# Patient Record
Sex: FEMALE | Race: WHITE | NOT HISPANIC OR LATINO | Employment: FULL TIME | URBAN - METROPOLITAN AREA
[De-identification: names, ages, dates, MRNs, and addresses within clinical notes are randomized per-mention and may not be internally consistent; named-entity substitution may affect disease eponyms.]

---

## 2019-07-03 ENCOUNTER — OFFICE VISIT (OUTPATIENT)
Dept: BARIATRICS/WEIGHT MGMT | Facility: CLINIC | Age: 49
End: 2019-07-03

## 2019-07-03 ENCOUNTER — RESULTS ENCOUNTER (OUTPATIENT)
Dept: BARIATRICS/WEIGHT MGMT | Facility: CLINIC | Age: 49
End: 2019-07-03

## 2019-07-03 ENCOUNTER — DOCUMENTATION (OUTPATIENT)
Dept: BARIATRICS/WEIGHT MGMT | Facility: CLINIC | Age: 49
End: 2019-07-03

## 2019-07-03 VITALS
OXYGEN SATURATION: 100 % | DIASTOLIC BLOOD PRESSURE: 74 MMHG | BODY MASS INDEX: 37.89 KG/M2 | HEART RATE: 75 BPM | RESPIRATION RATE: 18 BRPM | HEIGHT: 68 IN | SYSTOLIC BLOOD PRESSURE: 120 MMHG | TEMPERATURE: 97.4 F | WEIGHT: 250 LBS

## 2019-07-03 DIAGNOSIS — E66.9 OBESITY, CLASS II, BMI 35-39.9: Primary | ICD-10-CM

## 2019-07-03 DIAGNOSIS — K21.9 GASTROESOPHAGEAL REFLUX DISEASE, ESOPHAGITIS PRESENCE NOT SPECIFIED: ICD-10-CM

## 2019-07-03 DIAGNOSIS — R06.09 DYSPNEA ON EXERTION: ICD-10-CM

## 2019-07-03 DIAGNOSIS — R13.10 DYSPHAGIA, UNSPECIFIED TYPE: ICD-10-CM

## 2019-07-03 DIAGNOSIS — M54.9 BACK PAIN, UNSPECIFIED BACK LOCATION, UNSPECIFIED BACK PAIN LATERALITY, UNSPECIFIED CHRONICITY: ICD-10-CM

## 2019-07-03 DIAGNOSIS — R10.13 DYSPEPSIA: ICD-10-CM

## 2019-07-03 DIAGNOSIS — I10 ESSENTIAL HYPERTENSION: ICD-10-CM

## 2019-07-03 PROCEDURE — 99204 OFFICE O/P NEW MOD 45 MIN: CPT | Performed by: SURGERY

## 2019-07-03 RX ORDER — ATENOLOL 50 MG/1
50 TABLET ORAL DAILY
COMMUNITY

## 2019-07-03 RX ORDER — ERGOCALCIFEROL 1.25 MG/1
50000 CAPSULE ORAL WEEKLY
COMMUNITY
End: 2019-12-02

## 2019-07-03 RX ORDER — RANITIDINE 150 MG/1
150 TABLET ORAL 2 TIMES DAILY
COMMUNITY
End: 2019-10-22

## 2019-07-03 RX ORDER — CHLORTHALIDONE 25 MG/1
25 TABLET ORAL DAILY
COMMUNITY
End: 2019-10-25 | Stop reason: HOSPADM

## 2019-07-03 RX ORDER — DEXLANSOPRAZOLE 30 MG/1
30 CAPSULE, DELAYED RELEASE ORAL 2 TIMES DAILY
COMMUNITY
End: 2020-02-07

## 2019-07-03 NOTE — PROGRESS NOTES
[facility] GROUP BARIATRIC SURGERY  [address]  [facility] [address]  [phone]      Patient  Name:  Danielle Steward  :  1970      Date of Visit: 7/3/2019      Chief Complaint:  weight gain; unable to maintain weight loss    History of Present Illness:  Danielle Steward is a 48 y.o. female who presents today for evaluation, education and consultation regarding weight loss surgery. The patient is interested in sleeve gastrectomy.  She is a self-pay patient.     Danielle has been overweight for at least 25 years, has been 35 pounds or more overweight for at least 25 years, has been 100 pounds or more overweight for 15 or more years and started dieting at age 14.  The patient describes their eating habits as emotional eater and snacker.      Previous diet attempts include: WW, low carb diet, Adipex, Ozempic (lots of nausea) among others.  The most weight Danielle lost was 40 pounds on WW but was only able to maintain that weight loss for 4 years.  Her maximum lifetime weight is 275 pounds.    As above, patient has been overweight for many years, with numerous failed dietary/weight loss attempts.  She now has obesity related comorbidities and as such has decided to pursue weight loss surgery.    All past medical, surgical, social and family history have been obtained and discussed as pertinent to bariatric surgery as below.     She has friends who have had surgery at this practice and are doing well.      No personal hx of VTE, MI, kidney/liver failure      Past Medical History:   Diagnosis Date   • Back pain    • Blindness and low vision     right eye only, legally blind in right eye, has peripheral vision only   • Delayed gastric emptying     suspected given large amount of retained food on EGD.   • Dysphagia     occasional   • GERD (gastroesophageal reflux disease)     severe, on Dexilant bid and Zantac daily, controls. EGD by Dr. Agosto in [place] showed EGD 40  mg, findings of large amount of retained food.     • Glaucoma (increased eye pressure)     per pt not glaucoma yet, but hx of increased pressure in left eye, which is her only functional eye   • Hypertension    • Osteoarthritis    • Prediabetes    • Sleep disturbance     snoring, has not been tested for SUMAYA   • Vitamin D deficiency      Past Surgical History:   Procedure Laterality Date   • CATARACT EXTRACTION Right    • CATARACT EXTRACTION WITH INTRAOCULAR LENS IMPLANT Right    • HYSTEROSCOPY ENDOMETRIAL ABLATION     • LAPAROSCOPIC TUBAL LIGATION     • LASIK     • STRABISMUS SURGERY Right        Allergies   Allergen Reactions   • Methylprednisolone Other (See Comments)     Avoids due to increased pressure in eye/glaucoma risk per her optometrist       Current Outpatient Medications:   •  atenolol (TENORMIN) 50 MG tablet, Take 50 mg by mouth Daily., Disp: , Rfl:   •  chlorthalidone (HYGROTON) 25 MG tablet, Take 25 mg by mouth Daily., Disp: , Rfl:   •  dexlansoprazole (DEXILANT) 30 MG capsule, Take 30 mg by mouth Daily., Disp: , Rfl:   •  metFORMIN (GLUCOPHAGE) 500 MG tablet, Take 500 mg by mouth Daily., Disp: , Rfl:   •  Multiple Vitamins-Minerals (PRESERVISION AREDS 2 PO), Take  by mouth., Disp: , Rfl:   •  raNITIdine (ZANTAC) 150 MG tablet, Take 150 mg by mouth 2 (Two) Times a Day., Disp: , Rfl:   •  vitamin D (ERGOCALCIFEROL) 21087 units capsule capsule, Take 50,000 Units by mouth 1 (One) Time Per Week., Disp: , Rfl:     Social History     Socioeconomic History   • Marital status:      Spouse name: Not on file   • Number of children: Not on file   • Years of education: Not on file   • Highest education level: Not on file   Occupational History   • 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       Family History   Problem Relation Age of Onset   • Heart attack Maternal Grandfather 75       Review of Systems   Constitutional: Positive for fatigue and unexpected weight gain. Negative for chills, diaphoresis, fever and unexpected weight loss.   HENT: Negative for congestion and facial swelling.    Eyes: Negative for blurred vision, double vision and discharge.   Respiratory: Negative for chest tightness, shortness of breath and stridor.    Cardiovascular: Negative for chest pain, palpitations and leg swelling.   Gastrointestinal: Negative for blood in stool.   Endocrine: Negative for polydipsia.   Genitourinary: Negative for hematuria.   Musculoskeletal: Positive for arthralgias.   Skin: Negative for color change.   Allergic/Immunologic: Negative for immunocompromised state.   Neurological: Negative for confusion.   Psychiatric/Behavioral: Negative for self-injury.        Physical Exam:  Vital Signs:  Weight: 113 kg (250 lb)   Body mass index is 38.58 kg/m².  Temp: 97.4 °F (36.3 °C)   Heart Rate: 75   BP: 120/74     Physical Exam   Constitutional: She is oriented to person, place, and time. She appears well-developed and well-nourished.   HENT:   Head: Normocephalic and atraumatic.   Nose: Nose normal.   Eyes: Conjunctivae and EOM are normal. Pupils are equal, round, and reactive to light.   Neck: Normal range of motion. Neck supple. Carotid bruit is not present. No tracheal deviation present. No thyromegaly present.   Cardiovascular: Normal rate, regular rhythm and normal heart sounds.   Pulmonary/Chest: Effort normal and breath sounds normal. No respiratory distress.   Abdominal: Soft. She exhibits no distension. There is no hepatosplenomegaly. There is no tenderness.   Lap incision at umbilicus   Musculoskeletal: Normal range of motion. She exhibits no edema or deformity.    Neurological: She is alert and oriented to person, place, and time. No cranial nerve deficit. Coordination normal.   Skin: Skin is warm and dry. No rash noted.   Psychiatric: She has a normal mood and affect. Her behavior is normal. Judgment and thought content normal.   Vitals reviewed.      There is no problem list on file for this patient.      Assessment:    Danielle Steward is a 48 y.o. year old female with medically complicated obesity pursuing sleeve gastrectomy.    Weight loss surgery is deemed medically necessary given the following obesity related comorbidities including hypertension, back pain and gall bladder disease with current Weight: 113 kg (250 lb) and Body mass index is 38.58 kg/m²..    She is a good candidate for weight loss surgery pending further evaluation.    Plan:  The consultation plan and program requirements were reviewed with the patient.  The patient has been advised that a letter of medical support must be obtained from her primary care physician or referring provider. A psychological evaluation will be arranged.  A nutritional evaluation will be performed.  The patient was advised to start a high protein and low carbohydrate diet.  Necessary lifestyle modifications were discussed.  Instructions on how to access SULLY was given to the patient.  SULLY is an internet based educational video that explains the surgical procedure chosen and answers basic questions regarding that procedure.     Preoperative testing will include: CBC, CMP, Lipids, TSH, HgA1C, H.Pylori, CXR, EKG and gastric emptying study (to workup finding of retained food on prior EGD).  Suggest she get a sleep study re: suspected sleep apnea.  Outside EGD report reviewed.    Preop clearances required prior to surgery will include Cardiac.      Patient understands that bariatric surgery is not cosmetic surgery but rather a tool to help make a lifelong commitment to lifestyle changes including diet, exercise, behavior  modifications, and healthy habits.  The patient has been educated on expected postoperative lifestyle changes, including commitment to high protein diet, vitamin regimen, and exercise program.  They are aware that support groups are encouraged for optimal weight loss results.            Sherin Danielson MD              ADDENDUM:    Gastric emptying study with slightly delayed gastric emptying.

## 2019-07-04 LAB
ALBUMIN SERPL-MCNC: 4.2 G/DL (ref 3.5–5.5)
ALBUMIN/GLOB SERPL: 1.3 {RATIO} (ref 1.2–2.2)
ALP SERPL-CCNC: 109 IU/L (ref 39–117)
ALT SERPL-CCNC: 20 IU/L (ref 0–32)
AST SERPL-CCNC: 18 IU/L (ref 0–40)
BASOPHILS # BLD AUTO: 0 X10E3/UL (ref 0–0.2)
BASOPHILS NFR BLD AUTO: 0 %
BILIRUB SERPL-MCNC: 0.4 MG/DL (ref 0–1.2)
BUN SERPL-MCNC: 17 MG/DL (ref 6–24)
BUN/CREAT SERPL: 14 (ref 9–23)
CALCIUM SERPL-MCNC: 9.6 MG/DL (ref 8.7–10.2)
CHLORIDE SERPL-SCNC: 91 MMOL/L (ref 96–106)
CHOLEST SERPL-MCNC: 161 MG/DL (ref 100–199)
CO2 SERPL-SCNC: 31 MMOL/L (ref 20–29)
CREAT SERPL-MCNC: 1.18 MG/DL (ref 0.57–1)
EOSINOPHIL # BLD AUTO: 0.3 X10E3/UL (ref 0–0.4)
EOSINOPHIL NFR BLD AUTO: 3 %
ERYTHROCYTE [DISTWIDTH] IN BLOOD BY AUTOMATED COUNT: 12.7 % (ref 12.3–15.4)
GLOBULIN SER CALC-MCNC: 3.3 G/DL (ref 1.5–4.5)
GLUCOSE SERPL-MCNC: 328 MG/DL (ref 65–99)
HBA1C MFR BLD: 8.3 % (ref 4.8–5.6)
HCT VFR BLD AUTO: 45.9 % (ref 34–46.6)
HDLC SERPL-MCNC: 43 MG/DL
HGB BLD-MCNC: 15.1 G/DL (ref 11.1–15.9)
IMM GRANULOCYTES # BLD AUTO: 0 X10E3/UL (ref 0–0.1)
IMM GRANULOCYTES NFR BLD AUTO: 0 %
LDLC SERPL CALC-MCNC: 52 MG/DL (ref 0–99)
LYMPHOCYTES # BLD AUTO: 3.8 X10E3/UL (ref 0.7–3.1)
LYMPHOCYTES NFR BLD AUTO: 34 %
MCH RBC QN AUTO: 32.2 PG (ref 26.6–33)
MCHC RBC AUTO-ENTMCNC: 32.9 G/DL (ref 31.5–35.7)
MCV RBC AUTO: 98 FL (ref 79–97)
MONOCYTES # BLD AUTO: 0.5 X10E3/UL (ref 0.1–0.9)
MONOCYTES NFR BLD AUTO: 5 %
NEUTROPHILS # BLD AUTO: 6.6 X10E3/UL (ref 1.4–7)
NEUTROPHILS NFR BLD AUTO: 58 %
PLATELET # BLD AUTO: 300 X10E3/UL (ref 150–450)
POTASSIUM SERPL-SCNC: 4 MMOL/L (ref 3.5–5.2)
PROT SERPL-MCNC: 7.5 G/DL (ref 6–8.5)
RBC # BLD AUTO: 4.69 X10E6/UL (ref 3.77–5.28)
SODIUM SERPL-SCNC: 137 MMOL/L (ref 134–144)
TRIGL SERPL-MCNC: 328 MG/DL (ref 0–149)
TSH SERPL DL<=0.005 MIU/L-ACNC: 2.66 UIU/ML (ref 0.45–4.5)
VLDLC SERPL CALC-MCNC: 66 MG/DL (ref 5–40)
WBC # BLD AUTO: 11.3 X10E3/UL (ref 3.4–10.8)

## 2019-07-12 NOTE — PROGRESS NOTES
Weight Loss Surgery  Presurgical Nutrition Assessment     Danielle العلي Bin  07/03/2019  [id number]  [id number]  1970  female    Surgery desired: Sleeve Gastrectomy    Weight: 113 kg (250 lb)   Body mass index is 38.58 kg/m².  Past Medical History:   Diagnosis Date   • Back pain    • Blindness and low vision     right eye only, legally blind in right eye, has peripheral vision only   • Delayed gastric emptying     suspected given large amount of retained food on EGD.   • Dysphagia     occasional   • GERD (gastroesophageal reflux disease)     severe, on Dexilant bid and Zantac daily, controls. EGD by Dr. Agosto in [place] showed EGD 40 mg, findings of large amount of retained food.     • Glaucoma (increased eye pressure)     per pt not glaucoma yet, but hx of increased pressure in left eye, which is her only functional eye   • Hypertension    • Osteoarthritis    • Prediabetes    • Sleep disturbance     snoring, has not been tested for SUMAYA   • Vitamin D deficiency      Past Surgical History:   Procedure Laterality Date   • CATARACT EXTRACTION Right    • CATARACT EXTRACTION WITH INTRAOCULAR LENS IMPLANT Right    • HYSTEROSCOPY ENDOMETRIAL ABLATION     • LAPAROSCOPIC TUBAL LIGATION     • LASIK     • STRABISMUS SURGERY Right      Allergies   Allergen Reactions   • Methylprednisolone Other (See Comments)     Avoids due to increased pressure in eye/glaucoma risk per her optometrist       Current Outpatient Medications:   •  atenolol (TENORMIN) 50 MG tablet, Take 50 mg by mouth Daily., Disp: , Rfl:   •  chlorthalidone (HYGROTON) 25 MG tablet, Take 25 mg by mouth Daily., Disp: , Rfl:   •  dexlansoprazole (DEXILANT) 30 MG capsule, Take 30 mg by mouth Daily., Disp: , Rfl:   •  metFORMIN (GLUCOPHAGE) 500 MG tablet, Take 500 mg by mouth Daily., Disp: , Rfl:   •  Multiple Vitamins-Minerals (PRESERVISION AREDS 2 PO), Take  by mouth., Disp: , Rfl:   •  raNITIdine (ZANTAC) 150 MG tablet, Take 150 mg by mouth 2 (Two)  Times a Day., Disp: , Rfl:   •  vitamin D (ERGOCALCIFEROL) 71295 units capsule capsule, Take 50,000 Units by mouth 1 (One) Time Per Week., Disp: , Rfl:       Nutrition Assessment    Estimated energy needs: 1980    Estimated calories for weight loss: 1700    IBW (Pounds):  160      Excess body weight (Pounds): 90       Nutrition Recall  24 Hour recall: (B) (L) (D) -  Reviewed and discussed with patient       Exercise  rarely      Education    Provided manual:    Sleeve Gastrectomy    Provided follow-up options for support, including contact information for dietitians here, if desired.  Web-based support information and apps for smart phones and computers given.  Noted that monthly support group is offered at this clinic, and that support is associated with weight loss.    Recommend that team proceed with surgery and follow per protocol.      Nutrition Goals   Dietary Guidelines per manual  Protein goal:  grams per day     Exercise Goals  Add 15-30 minutes of activity per day as tolerated        Angie Candelario RD  07/03/2019  10:09 AM

## 2019-07-17 ENCOUNTER — HOSPITAL ENCOUNTER (OUTPATIENT)
Dept: NUCLEAR MEDICINE | Facility: HOSPITAL | Age: 49
Discharge: HOME | End: 2019-07-17

## 2019-07-17 DIAGNOSIS — R10.13 DYSPEPSIA: ICD-10-CM

## 2019-07-17 DIAGNOSIS — K21.9 GASTROESOPHAGEAL REFLUX DISEASE, ESOPHAGITIS PRESENCE NOT SPECIFIED: ICD-10-CM

## 2019-07-17 DIAGNOSIS — R13.10 DYSPHAGIA, UNSPECIFIED TYPE: ICD-10-CM

## 2019-07-17 PROCEDURE — 0 TECHNETIUM SULFUR COLLOID: Performed by: SURGERY

## 2019-07-17 PROCEDURE — 78264 GASTRIC EMPTYING IMG STUDY: CPT

## 2019-07-17 PROCEDURE — A9541 TC99M SULFUR COLLOID: HCPCS | Performed by: SURGERY

## 2019-07-17 RX ADMIN — TECHNETIUM TC 99M SULFUR COLLOID 1 DOSE: KIT at 09:08

## 2019-08-06 ENCOUNTER — OFFICE VISIT (OUTPATIENT)
Dept: CARDIOLOGY | Facility: CLINIC | Age: 49
End: 2019-08-06

## 2019-08-06 VITALS
OXYGEN SATURATION: 98 % | HEIGHT: 68 IN | SYSTOLIC BLOOD PRESSURE: 112 MMHG | DIASTOLIC BLOOD PRESSURE: 76 MMHG | HEART RATE: 63 BPM | BODY MASS INDEX: 37.13 KG/M2 | WEIGHT: 245 LBS

## 2019-08-06 DIAGNOSIS — I10 ESSENTIAL HYPERTENSION: ICD-10-CM

## 2019-08-06 DIAGNOSIS — R00.1 SINUS BRADYCARDIA: ICD-10-CM

## 2019-08-06 DIAGNOSIS — E66.09 CLASS 2 OBESITY DUE TO EXCESS CALORIES WITHOUT SERIOUS COMORBIDITY WITH BODY MASS INDEX (BMI) OF 37.0 TO 37.9 IN ADULT: ICD-10-CM

## 2019-08-06 DIAGNOSIS — R06.09 DOE (DYSPNEA ON EXERTION): ICD-10-CM

## 2019-08-06 DIAGNOSIS — Z01.818 PREOPERATIVE CLEARANCE: Primary | ICD-10-CM

## 2019-08-06 DIAGNOSIS — R94.31 ABNORMAL EKG: ICD-10-CM

## 2019-08-06 DIAGNOSIS — IMO0001 DIABETES MELLITUS TYPE 2, UNCONTROLLED, WITHOUT COMPLICATIONS: ICD-10-CM

## 2019-08-06 DIAGNOSIS — E78.1 PURE HYPERGLYCERIDEMIA: ICD-10-CM

## 2019-08-06 PROCEDURE — 93000 ELECTROCARDIOGRAM COMPLETE: CPT | Performed by: INTERNAL MEDICINE

## 2019-08-06 PROCEDURE — 99204 OFFICE O/P NEW MOD 45 MIN: CPT | Performed by: INTERNAL MEDICINE

## 2019-08-06 NOTE — PROGRESS NOTES
Subjective   Chief Complaint   Patient presents with   • Surgical Clearance     Gastric Sleeve        History of Present Illness  Patient is 48 years old white female who is here for preop cardiac clearance.  Plan patient is planning to have gastric sleeve surgery.  Patient has history of abnormal EKG and also complains of dyspnea on exertion  She denies any chest pain on exertion.  There is no prior cardiac history.  No rheumatic fever or heart murmur.    Patient has multiple risk factors for coronary artery disease including obesity, diabetes mellitus, hypertension and hyperlipidemia.  Patient also has mild renal dysfunction.    Patient does not smoke.    Patient currently is taking metformin  once a day.  She also takes Jardiance for diabetes mellitus.  Blood pressure is controlled with the [place]gro[place] and Tenormin    Patient also has GERD and is taking Dexilant and Zantac.    Past Surgical History:   Procedure Laterality Date   • CATARACT EXTRACTION Right    • CATARACT EXTRACTION WITH INTRAOCULAR LENS IMPLANT Right    • HYSTEROSCOPY ENDOMETRIAL ABLATION     • LAPAROSCOPIC TUBAL LIGATION     • LASIK     • STRABISMUS SURGERY Right      Family History   Problem Relation Age of Onset   • Heart attack Maternal Grandfather 75     Past Medical History:   Diagnosis Date   • Back pain    • Blindness and low vision     right eye only, legally blind in right eye, has peripheral vision only   • Delayed gastric emptying     suspected given large amount of retained food on EGD.   • Dysphagia     occasional   • GERD (gastroesophageal reflux disease)     severe, on Dexilant bid and Zantac daily, controls. EGD by Dr. Agosto in [place] showed GE junction 40 mg, findings of large amount of retained food.     • Glaucoma (increased eye pressure)     per pt not glaucoma yet, but hx of increased pressure in left eye, which is her only functional eye   • Hypertension    • Osteoarthritis    • Prediabetes    • Sleep disturbance      snoring, has not been tested for SUMAYA   • Vitamin D deficiency        Patient Active Problem List   Diagnosis   • CARTER (dyspnea on exertion)   • Preoperative clearance   • Sinus bradycardia   • Abnormal EKG         Social History     Tobacco Use   • Smoking status: Never Smoker   • Smokeless tobacco: Never Used   • Tobacco comment: is not around secondhand smoke   Substance Use Topics   • Alcohol use: No     Frequency: Never   • Drug use: No         The following portions of the patient's history were reviewed and updated as appropriate: allergies, current medications, past family history, past medical history, past social history, past surgical history and problem list.    Allergies   Allergen Reactions   • Methylprednisolone Other (See Comments)     Avoids due to increased pressure in eye/glaucoma risk per her optometrist         Current Outpatient Medications:   •  atenolol (TENORMIN) 50 MG tablet, Take 50 mg by mouth Daily., Disp: , Rfl:   •  chlorthalidone (HYGROTON) 25 MG tablet, Take 25 mg by mouth Daily., Disp: , Rfl:   •  dexlansoprazole (DEXILANT) 30 MG capsule, Take 30 mg by mouth Daily., Disp: , Rfl:   •  metFORMIN (GLUCOPHAGE) 500 MG tablet, Take 500 mg by mouth Daily., Disp: , Rfl:   •  Multiple Vitamins-Minerals (PRESERVISION AREDS 2 PO), Take  by mouth., Disp: , Rfl:   •  raNITIdine (ZANTAC) 150 MG tablet, Take 150 mg by mouth 2 (Two) Times a Day., Disp: , Rfl:   •  vitamin D (ERGOCALCIFEROL) 44548 units capsule capsule, Take 50,000 Units by mouth 1 (One) Time Per Week., Disp: , Rfl:     Review of Systems   Constitution: Negative.   HENT: Negative.  Negative for congestion.    Eyes: Negative.    Cardiovascular: Positive for dyspnea on exertion. Negative for chest pain, cyanosis, irregular heartbeat, leg swelling, near-syncope, orthopnea, palpitations, paroxysmal nocturnal dyspnea and syncope.   Respiratory: Negative.  Negative for shortness of breath.    Endocrine: Negative.   "  Hematologic/Lymphatic: Negative.    Skin: Negative.    Musculoskeletal: Negative.    Gastrointestinal: Positive for heartburn.   Genitourinary: Negative.    Neurological: Negative.  Negative for headaches.   Psychiatric/Behavioral: Negative.         Objective      /76 (BP Location: Left arm, Patient Position: Sitting)   Pulse 63   Ht 171.5 cm (67.5\")   Wt 111 kg (245 lb)   SpO2 98%   BMI 37.81 kg/m²     Physical Exam   Constitutional: She appears well-developed and well-nourished. No distress.   HENT:   Head: Normocephalic and atraumatic.   Mouth/Throat: Oropharynx is clear and moist. No oropharyngeal exudate.   Eyes: Conjunctivae and EOM are normal. Pupils are equal, round, and reactive to light. No scleral icterus.   Neck: Normal range of motion. Neck supple. No JVD present. No tracheal deviation present. No thyromegaly present.   Cardiovascular: Normal rate, regular rhythm, normal heart sounds and intact distal pulses. PMI is not displaced. Exam reveals no gallop, no friction rub and no decreased pulses.   No murmur heard.  Pulses:       Carotid pulses are 3+ on the right side, and 3+ on the left side.       Radial pulses are 3+ on the right side, and 3+ on the left side.   Pulmonary/Chest: Effort normal and breath sounds normal. No respiratory distress. She has no wheezes. She has no rales. She exhibits no tenderness.   Abdominal: Soft. Bowel sounds are normal. She exhibits no distension, no abdominal bruit and no mass. There is no splenomegaly or hepatomegaly. There is no tenderness. There is no rebound and no guarding.   Musculoskeletal: Normal range of motion. She exhibits no edema, tenderness or deformity.   Lymphadenopathy:     She has no cervical adenopathy.   Neurological: She is alert. She has normal reflexes. No cranial nerve deficit. She exhibits normal muscle tone. Coordination normal.   Skin: Skin is warm and dry. No rash noted. She is not diaphoretic. No erythema.   Psychiatric: She has " a normal mood and affect. Her behavior is normal. Judgment and thought content normal.       Lab Review:    Lab Results   Component Value Date     07/03/2019    K 4.0 07/03/2019    CL 91 (L) 07/03/2019    BUN 17 07/03/2019    CREATININE 1.18 (H) 07/03/2019     (H) 07/03/2019    CALCIUM 9.6 07/03/2019    ALT 20 07/03/2019    ALKPHOS 109 07/03/2019    LABIL2 1.3 07/03/2019     No results found for: CKTOTAL  Lab Results   Component Value Date    WBC 11.3 (H) 07/03/2019    HGB 15.1 07/03/2019    HCT 45.9 07/03/2019     07/03/2019     No results found for: INR  No results found for: MG  Lab Results   Component Value Date    CHLPL 161 07/03/2019    TRIG 328 (H) 07/03/2019    HDL 43 07/03/2019    VLDL 66 (H) 07/03/2019     No results found for: BNP      ECG 12 Lead  Date/Time: 8/6/2019 2:00 PM  Performed by: Bear Camacho MD  Authorized by: Bear Camacho MD   Comparison: not compared with previous ECG   Previous ECG: no previous ECG available  Rhythm: sinus rhythm  Rate: bradycardic  BPM: 55  Conduction: conduction normal  QRS axis: normal  Other findings: non-specific ST-T wave changes and poor R wave progression    Clinical impression: abnormal EKG            I reviewed the patient's new clinical results.  I personally viewed and interpreted the patient's EKG/lab data        Assessment:   Diagnosis Plan   1. Preoperative clearance  Adult Transthoracic Echo Complete W/ Cont if Necessary Per Protocol    Stress Test With Myocardial Perfusion One Day   2. CARTER (dyspnea on exertion)  Adult Transthoracic Echo Complete W/ Cont if Necessary Per Protocol    Stress Test With Myocardial Perfusion One Day    ECG 12 Lead   3. Pure hyperglyceridemia     4. Diabetes mellitus type 2, uncontrolled, without complications (CMS/HCC)     5. Class 2 obesity due to excess calories without serious comorbidity with body mass index (BMI) of 37.0 to 37.9 in adult  Adult Transthoracic Echo Complete W/ Cont if  Necessary Per Protocol    Stress Test With Myocardial Perfusion One Day   6. Essential hypertension     7. Sinus bradycardia     8. Abnormal EKG            Plan:  Patient is being evaluated for preop cardiac clearance.  EKG shows sinus bradycardia, poor R wave progression and nonspecific ST and T wave changes.  Because of abnormal EKG, dyspnea on exertion and multiple cardiac risk factors further work-up is indicated.  A stress test and echocardiogram was scheduled for further evaluation.  Patient will be reevaluated after the studies are completed.    Thank you for giving me the oppertunity to participate in your patient's cardiac care.    Sincerely,    JESS Camacho M.D. FACP FACC     No Follow-up on file.

## 2019-08-16 ENCOUNTER — HOSPITAL ENCOUNTER (OUTPATIENT)
Dept: NUCLEAR MEDICINE | Facility: HOSPITAL | Age: 49
Discharge: HOME | End: 2019-08-16

## 2019-08-16 ENCOUNTER — HOSPITAL ENCOUNTER (OUTPATIENT)
Dept: CARDIOLOGY | Facility: HOSPITAL | Age: 49
Discharge: HOME | End: 2019-08-16

## 2019-08-16 DIAGNOSIS — R06.09 DOE (DYSPNEA ON EXERTION): ICD-10-CM

## 2019-08-16 DIAGNOSIS — E66.09 CLASS 2 OBESITY DUE TO EXCESS CALORIES WITHOUT SERIOUS COMORBIDITY WITH BODY MASS INDEX (BMI) OF 37.0 TO 37.9 IN ADULT: ICD-10-CM

## 2019-08-16 DIAGNOSIS — Z01.818 PREOPERATIVE CLEARANCE: ICD-10-CM

## 2019-08-16 LAB
BH CV ECHO MEAS - % IVS THICK: 81.6 %
BH CV ECHO MEAS - % LVPW THICK: 24.1 %
BH CV ECHO MEAS - ACS: 2.1 CM
BH CV ECHO MEAS - AO ROOT AREA (BSA CORRECTED): 1.4
BH CV ECHO MEAS - AO ROOT AREA: 7 CM^2
BH CV ECHO MEAS - AO ROOT DIAM: 3 CM
BH CV ECHO MEAS - BSA(HAYCOCK): 2.3 M^2
BH CV ECHO MEAS - BSA: 2.2 M^2
BH CV ECHO MEAS - BZI_BMI: 38.4 KILOGRAMS/M^2
BH CV ECHO MEAS - BZI_METRIC_HEIGHT: 170.2 CM
BH CV ECHO MEAS - BZI_METRIC_WEIGHT: 111.1 KG
BH CV ECHO MEAS - EDV(CUBED): 96.2 ML
BH CV ECHO MEAS - EDV(MOD-SP4): 63 ML
BH CV ECHO MEAS - EDV(TEICH): 96.4 ML
BH CV ECHO MEAS - EF(CUBED): 77.1 %
BH CV ECHO MEAS - EF(MOD-SP4): 60.3 %
BH CV ECHO MEAS - EF(TEICH): 69.3 %
BH CV ECHO MEAS - ESV(CUBED): 22 ML
BH CV ECHO MEAS - ESV(MOD-SP4): 25 ML
BH CV ECHO MEAS - ESV(TEICH): 29.6 ML
BH CV ECHO MEAS - FS: 38.8 %
BH CV ECHO MEAS - IVS/LVPW: 0.86
BH CV ECHO MEAS - IVSD: 0.93 CM
BH CV ECHO MEAS - IVSS: 1.7 CM
BH CV ECHO MEAS - LA DIMENSION: 3.7 CM
BH CV ECHO MEAS - LA/AO: 1.2
BH CV ECHO MEAS - LV DIASTOLIC VOL/BSA (35-75): 28.6 ML/M^2
BH CV ECHO MEAS - LV MASS(C)D: 159.6 GRAMS
BH CV ECHO MEAS - LV MASS(C)DI: 72.4 GRAMS/M^2
BH CV ECHO MEAS - LV MASS(C)S: 148.2 GRAMS
BH CV ECHO MEAS - LV MASS(C)SI: 67.2 GRAMS/M^2
BH CV ECHO MEAS - LV SYSTOLIC VOL/BSA (12-30): 11.3 ML/M^2
BH CV ECHO MEAS - LVIDD: 4.6 CM
BH CV ECHO MEAS - LVIDS: 2.8 CM
BH CV ECHO MEAS - LVLD AP4: 7.8 CM
BH CV ECHO MEAS - LVLS AP4: 6.3 CM
BH CV ECHO MEAS - LVOT AREA (M): 2 CM^2
BH CV ECHO MEAS - LVOT AREA: 2.1 CM^2
BH CV ECHO MEAS - LVOT DIAM: 1.6 CM
BH CV ECHO MEAS - LVPWD: 1.1 CM
BH CV ECHO MEAS - LVPWS: 1.3 CM
BH CV ECHO MEAS - MV A MAX VEL: 83.9 CM/SEC
BH CV ECHO MEAS - MV E MAX VEL: 69.1 CM/SEC
BH CV ECHO MEAS - MV E/A: 0.82
BH CV ECHO MEAS - PA ACC SLOPE: 1317 CM/SEC^2
BH CV ECHO MEAS - PA ACC TIME: 0.08 SEC
BH CV ECHO MEAS - PA PR(ACCEL): 41 MMHG
BH CV ECHO MEAS - RVDD: 2.9 CM
BH CV ECHO MEAS - SI(CUBED): 33.6 ML/M^2
BH CV ECHO MEAS - SI(MOD-SP4): 17.2 ML/M^2
BH CV ECHO MEAS - SI(TEICH): 30.3 ML/M^2
BH CV ECHO MEAS - SV(CUBED): 74.1 ML
BH CV ECHO MEAS - SV(MOD-SP4): 38 ML
BH CV ECHO MEAS - SV(TEICH): 66.8 ML
BH CV NUCLEAR PRIOR STUDY: 3
BH CV STRESS BP STAGE 1: NORMAL
BH CV STRESS BP STAGE 2: NORMAL
BH CV STRESS BP STAGE 3: NORMAL
BH CV STRESS DURATION MIN STAGE 1: 3
BH CV STRESS DURATION MIN STAGE 2: 3
BH CV STRESS DURATION MIN STAGE 3: 1
BH CV STRESS DURATION SEC STAGE 1: 0
BH CV STRESS DURATION SEC STAGE 2: 0
BH CV STRESS DURATION SEC STAGE 3: 0
BH CV STRESS GRADE STAGE 1: 10
BH CV STRESS GRADE STAGE 2: 12
BH CV STRESS GRADE STAGE 3: 14
BH CV STRESS HR STAGE 1: 120
BH CV STRESS HR STAGE 2: 148
BH CV STRESS HR STAGE 3: 161
BH CV STRESS METS STAGE 1: 5
BH CV STRESS METS STAGE 2: 7.5
BH CV STRESS METS STAGE 3: 10.1
BH CV STRESS PROTOCOL 1: NORMAL
BH CV STRESS RECOVERY BP: NORMAL MMHG
BH CV STRESS RECOVERY HR: 93 BPM
BH CV STRESS SPEED STAGE 1: 1.7
BH CV STRESS SPEED STAGE 2: 2.5
BH CV STRESS SPEED STAGE 3: 3.4
BH CV STRESS STAGE 1: 1
BH CV STRESS STAGE 2: 2
BH CV STRESS STAGE 3: 3
LV EF NUC BP: 67 %
MAXIMAL PREDICTED HEART RATE: 171 BPM
MAXIMAL PREDICTED HEART RATE: 171 BPM
PERCENT MAX PREDICTED HR: 94.15 %
STRESS BASELINE BP: NORMAL MMHG
STRESS BASELINE HR: 93 BPM
STRESS PERCENT HR: 111 %
STRESS POST ESTIMATED WORKLOAD: 10.1 METS
STRESS POST EXERCISE DUR MIN: 7 MIN
STRESS POST EXERCISE DUR SEC: 0 SEC
STRESS POST PEAK BP: NORMAL MMHG
STRESS POST PEAK HR: 161 BPM
STRESS TARGET HR: 145 BPM
STRESS TARGET HR: 145 BPM

## 2019-08-16 PROCEDURE — 0 TECHNETIUM SESTAMIBI: Performed by: INTERNAL MEDICINE

## 2019-08-16 PROCEDURE — 93306 TTE W/DOPPLER COMPLETE: CPT

## 2019-08-16 PROCEDURE — 93018 CV STRESS TEST I&R ONLY: CPT | Performed by: INTERNAL MEDICINE

## 2019-08-16 PROCEDURE — 93306 TTE W/DOPPLER COMPLETE: CPT | Performed by: INTERNAL MEDICINE

## 2019-08-16 PROCEDURE — A9500 TC99M SESTAMIBI: HCPCS | Performed by: INTERNAL MEDICINE

## 2019-08-16 PROCEDURE — 93017 CV STRESS TEST TRACING ONLY: CPT

## 2019-08-16 PROCEDURE — 78452 HT MUSCLE IMAGE SPECT MULT: CPT | Performed by: INTERNAL MEDICINE

## 2019-08-16 PROCEDURE — 78452 HT MUSCLE IMAGE SPECT MULT: CPT

## 2019-08-16 RX ADMIN — TECHNETIUM TC 99M SESTAMIBI 1 DOSE: 1 INJECTION INTRAVENOUS at 09:00

## 2019-08-16 RX ADMIN — TECHNETIUM TC 99M SESTAMIBI 1 DOSE: 1 INJECTION INTRAVENOUS at 10:45

## 2019-08-19 ENCOUNTER — TELEPHONE (OUTPATIENT)
Dept: CARDIOLOGY | Facility: CLINIC | Age: 49
End: 2019-08-19

## 2019-08-29 ENCOUNTER — OFFICE VISIT (OUTPATIENT)
Dept: CARDIOLOGY | Facility: CLINIC | Age: 49
End: 2019-08-29

## 2019-08-29 VITALS
BODY MASS INDEX: 37.28 KG/M2 | HEART RATE: 62 BPM | WEIGHT: 246 LBS | OXYGEN SATURATION: 96 % | SYSTOLIC BLOOD PRESSURE: 116 MMHG | HEIGHT: 68 IN | DIASTOLIC BLOOD PRESSURE: 76 MMHG

## 2019-08-29 DIAGNOSIS — E66.09 CLASS 2 OBESITY DUE TO EXCESS CALORIES WITHOUT SERIOUS COMORBIDITY WITH BODY MASS INDEX (BMI) OF 37.0 TO 37.9 IN ADULT: ICD-10-CM

## 2019-08-29 DIAGNOSIS — Z01.818 PREOPERATIVE CLEARANCE: Primary | ICD-10-CM

## 2019-08-29 PROCEDURE — 99213 OFFICE O/P EST LOW 20 MIN: CPT | Performed by: INTERNAL MEDICINE

## 2019-08-29 NOTE — PROGRESS NOTES
subjective     Chief Complaint   Patient presents with   • Surgical Clearance     Gastric Sleeve   • Results     Stress & Echo     History of Present Illness  Patient is 48 years old white female who is here for preop cardiac clearance.  Patient plans to have gastric sleeve surgery.  Patient underwent cardiac work-up including echo and stress test.  Blood pressure is normal she is taking Tenormin and Hygroton.    Past Surgical History:   Procedure Laterality Date   • CATARACT EXTRACTION Right    • CATARACT EXTRACTION WITH INTRAOCULAR LENS IMPLANT Right    • HYSTEROSCOPY ENDOMETRIAL ABLATION     • LAPAROSCOPIC TUBAL LIGATION     • LASIK     • STRABISMUS SURGERY Right      Family History   Problem Relation Age of Onset   • Heart attack Maternal Grandfather 75     Past Medical History:   Diagnosis Date   • Back pain    • Blindness and low vision     right eye only, legally blind in right eye, has peripheral vision only   • Delayed gastric emptying     suspected given large amount of retained food on EGD.   • Dysphagia     occasional   • GERD (gastroesophageal reflux disease)     severe, on Dexilant bid and Zantac daily, controls. EGD by Dr. Agosto in [place] showed GE junction 40 mg, findings of large amount of retained food.     • Glaucoma (increased eye pressure)     per pt not glaucoma yet, but hx of increased pressure in left eye, which is her only functional eye   • Hypertension    • Osteoarthritis    • Prediabetes    • Sleep disturbance     snoring, has not been tested for SUMAYA   • Vitamin D deficiency      Patient Active Problem List   Diagnosis   • CARTER (dyspnea on exertion)   • Preoperative clearance   • Sinus bradycardia   • Abnormal EKG   • Class 2 obesity due to excess calories without serious comorbidity in adult       Social History     Tobacco Use   • Smoking status: Never Smoker   • Smokeless tobacco: Never Used   • Tobacco comment: is not around secondhand smoke   Substance Use Topics   • Alcohol  "use: No     Frequency: Never   • Drug use: No       Allergies   Allergen Reactions   • Methylprednisolone Other (See Comments)     Avoids due to increased pressure in eye/glaucoma risk per her optometrist       Current Outpatient Medications on File Prior to Visit   Medication Sig   • atenolol (TENORMIN) 50 MG tablet Take 50 mg by mouth Daily.   • chlorthalidone (HYGROTON) 25 MG tablet Take 25 mg by mouth Daily.   • dexlansoprazole (DEXILANT) 30 MG capsule Take 30 mg by mouth Daily.   • Empagliflozin (JARDIANCE) 10 MG tablet Take  by mouth.   • metFORMIN (GLUCOPHAGE) 850 MG tablet Take 850 mg by mouth Daily.   • Multiple Vitamins-Minerals (PRESERVISION AREDS 2 PO) Take  by mouth.   • raNITIdine (ZANTAC) 150 MG tablet Take 150 mg by mouth 2 (Two) Times a Day.   • vitamin D (ERGOCALCIFEROL) 13682 units capsule capsule Take 50,000 Units by mouth 1 (One) Time Per Week.     No current facility-administered medications on file prior to visit.          The following portions of the patient's history were reviewed and updated as appropriate: allergies, current medications, past family history, past medical history, past social history, past surgical history and problem list.    Review of Systems   Constitution: Negative.   HENT: Negative.  Negative for congestion.    Eyes: Negative.    Cardiovascular: Negative.  Negative for chest pain, cyanosis, dyspnea on exertion, irregular heartbeat, leg swelling, near-syncope, orthopnea, palpitations, paroxysmal nocturnal dyspnea and syncope.   Respiratory: Negative.  Negative for shortness of breath.    Hematologic/Lymphatic: Negative.    Musculoskeletal: Negative.    Gastrointestinal: Negative.    Neurological: Negative.  Negative for headaches.          Objective:     /76 (BP Location: Left arm, Patient Position: Sitting, Cuff Size: Adult)   Pulse 62   Ht 171.5 cm (67.5\")   Wt 112 kg (246 lb)   SpO2 96%   BMI 37.96 kg/m²   Physical Exam   Constitutional: She appears " well-developed and well-nourished. No distress.   HENT:   Head: Normocephalic and atraumatic.   Mouth/Throat: Oropharynx is clear and moist. No oropharyngeal exudate.   Eyes: Conjunctivae and EOM are normal. Pupils are equal, round, and reactive to light. No scleral icterus.   Neck: Normal range of motion. Neck supple. No JVD present. No tracheal deviation present. No thyromegaly present.   Cardiovascular: Normal rate, regular rhythm, normal heart sounds and intact distal pulses. PMI is not displaced. Exam reveals no gallop, no friction rub and no decreased pulses.   No murmur heard.  Pulses:       Carotid pulses are 3+ on the right side, and 3+ on the left side.       Radial pulses are 3+ on the right side, and 3+ on the left side.   Pulmonary/Chest: Effort normal and breath sounds normal. No respiratory distress. She has no wheezes. She has no rales. She exhibits no tenderness.   Abdominal: Soft. Bowel sounds are normal. She exhibits no distension, no abdominal bruit and no mass. There is no splenomegaly or hepatomegaly. There is no tenderness. There is no rebound and no guarding.   Musculoskeletal: Normal range of motion. She exhibits no edema, tenderness or deformity.   Lymphadenopathy:     She has no cervical adenopathy.   Neurological: She is alert. She has normal reflexes. No cranial nerve deficit. She exhibits normal muscle tone. Coordination normal.   Skin: Skin is warm and dry. No rash noted. She is not diaphoretic. No erythema.   Psychiatric: She has a normal mood and affect. Her behavior is normal. Judgment and thought content normal.         Lab Review  Lab Results   Component Value Date     07/03/2019    K 4.0 07/03/2019    CL 91 (L) 07/03/2019    BUN 17 07/03/2019    CREATININE 1.18 (H) 07/03/2019     (H) 07/03/2019    CALCIUM 9.6 07/03/2019    ALT 20 07/03/2019    ALKPHOS 109 07/03/2019    LABIL2 1.3 07/03/2019     No results found for: CKTOTAL  Lab Results   Component Value Date    WBC  11.3 (H) 07/03/2019    HGB 15.1 07/03/2019    HCT 45.9 07/03/2019     07/03/2019     No results found for: INR  No results found for: MG  Lab Results   Component Value Date    TSH 2.660 07/03/2019     No results found for: BNP  Lab Results   Component Value Date    CHLPL 161 07/03/2019    TRIG 328 (H) 07/03/2019    HDL 43 07/03/2019    VLDL 66 (H) 07/03/2019     Lab Results   Component Value Date    LDL 52 07/03/2019       Procedures     Interpretation Summary stress test 8/16/2019    · A stress test was performed following the Moi protocol.  · Baseline ECG of normal sinus rhythm noted. Non-specific ST-T wave changes noted.  · Pt walked on treadmill for 7 minutes achieving target heart rate.  · No complaint of chest pain. Testing stopped due to dyspnea and fatigue.  · Blood pressure demonstrated a normal response to stress.  · Findings consistent with a normal ECG stress test.  · Left ventricular ejection fraction is normal (Calculated EF = 67%).  · Myocardial perfusion imaging indicates a normal myocardial perfusion study with no evidence of ischemia.  · Impressions are consistent with a low risk study.  · There is no prior study available for comparison.        Interpretation Summary echo 8/16/2019    · Normal left ventricular cavity size and wall thickness noted.  · Left ventricular systolic function is normal. Estimated EF appears to be in the range of 61 - 65%.  · Left ventricular diastolic dysfunction (grade I) consistent with impaired relaxation.  · No significant valvular heart disease  · There is no evidence of pericardial effusion.       I personally viewed and interpreted the patient's LAB data         Assessment:     1. Preoperative clearance    2. Class 2 obesity due to excess calories without serious comorbidity with body mass index (BMI) of 37.0 to 37.9 in adult          Plan:     Patient is 48 years old white female who is planning to have gastric sleeve surgery for obesity.  Patient  underwent cardiac work-up.  Echocardiogram showed normal LV ejection fraction with no significant valvular heart disease.  Stress test was negative for significant ischemia with normal LV ejection fraction.    Blood pressure is very well controlled.  Patient is cleared for surgery from cardiac standpoint with standard perioperative precautions.    Thank you for giving me the oppertunity to participate in your patient's cardiac care.    Sincerely,    JESS Camacho M.D. [facility]          No Follow-up on file.

## 2019-09-16 DIAGNOSIS — R06.00 DYSPNEA, UNSPECIFIED TYPE: ICD-10-CM

## 2019-09-16 DIAGNOSIS — R53.83 FATIGUE, UNSPECIFIED TYPE: Primary | ICD-10-CM

## 2019-09-19 ENCOUNTER — HOSPITAL ENCOUNTER (OUTPATIENT)
Dept: GENERAL RADIOLOGY | Facility: HOSPITAL | Age: 49
Discharge: HOME | End: 2019-09-19
Admitting: PHYSICIAN ASSISTANT

## 2019-09-19 ENCOUNTER — LAB (OUTPATIENT)
Dept: LAB | Facility: HOSPITAL | Age: 49
End: 2019-09-19

## 2019-09-19 DIAGNOSIS — R06.00 DYSPNEA, UNSPECIFIED TYPE: ICD-10-CM

## 2019-09-19 DIAGNOSIS — R53.83 FATIGUE, UNSPECIFIED TYPE: ICD-10-CM

## 2019-09-19 LAB
ALBUMIN SERPL-MCNC: 3.8 G/DL (ref 3.5–5.2)
ALBUMIN/GLOB SERPL: 1 G/DL
ALP SERPL-CCNC: 85 U/L (ref 39–117)
ALT SERPL W P-5'-P-CCNC: 13 U/L (ref 1–33)
ANION GAP SERPL CALCULATED.3IONS-SCNC: 12.4 MMOL/L (ref 5–15)
AST SERPL-CCNC: 15 U/L (ref 1–32)
BILIRUB SERPL-MCNC: 0.3 MG/DL (ref 0.2–1.2)
BUN BLD-MCNC: 20 MG/DL (ref 6–20)
BUN/CREAT SERPL: 20.2 (ref 7–25)
CALCIUM SPEC-SCNC: 9.5 MG/DL (ref 8.6–10.5)
CHLORIDE SERPL-SCNC: 96 MMOL/L (ref 98–107)
CO2 SERPL-SCNC: 32.6 MMOL/L (ref 22–29)
CREAT BLD-MCNC: 0.99 MG/DL (ref 0.57–1)
DEPRECATED RDW RBC AUTO: 40.3 FL (ref 37–54)
ERYTHROCYTE [DISTWIDTH] IN BLOOD BY AUTOMATED COUNT: 11.7 % (ref 12.3–15.4)
GFR SERPL CREATININE-BSD FRML MDRD: 60 ML/MIN/1.73
GLOBULIN UR ELPH-MCNC: 3.8 GM/DL
GLUCOSE BLD-MCNC: 134 MG/DL (ref 65–99)
HCT VFR BLD AUTO: 45.9 % (ref 34–46.6)
HGB BLD-MCNC: 15.1 G/DL (ref 12–15.9)
MCH RBC QN AUTO: 31.3 PG (ref 26.6–33)
MCHC RBC AUTO-ENTMCNC: 32.9 G/DL (ref 31.5–35.7)
MCV RBC AUTO: 95 FL (ref 79–97)
PLATELET # BLD AUTO: 326 10*3/MM3 (ref 140–450)
PMV BLD AUTO: 11.2 FL (ref 6–12)
POTASSIUM BLD-SCNC: 3.9 MMOL/L (ref 3.5–5.2)
PROT SERPL-MCNC: 7.6 G/DL (ref 6–8.5)
RBC # BLD AUTO: 4.83 10*6/MM3 (ref 3.77–5.28)
SODIUM BLD-SCNC: 141 MMOL/L (ref 136–145)
WBC NRBC COR # BLD: 8.92 10*3/MM3 (ref 3.4–10.8)

## 2019-09-19 PROCEDURE — 71046 X-RAY EXAM CHEST 2 VIEWS: CPT | Performed by: RADIOLOGY

## 2019-09-19 PROCEDURE — 80053 COMPREHEN METABOLIC PANEL: CPT

## 2019-09-19 PROCEDURE — 71046 X-RAY EXAM CHEST 2 VIEWS: CPT

## 2019-09-19 PROCEDURE — 36415 COLL VENOUS BLD VENIPUNCTURE: CPT

## 2019-09-19 PROCEDURE — 85027 COMPLETE CBC AUTOMATED: CPT

## 2019-09-24 ENCOUNTER — CONSULT (OUTPATIENT)
Dept: BARIATRICS/WEIGHT MGMT | Facility: CLINIC | Age: 49
End: 2019-09-24

## 2019-09-24 VITALS
TEMPERATURE: 96.6 F | SYSTOLIC BLOOD PRESSURE: 122 MMHG | DIASTOLIC BLOOD PRESSURE: 84 MMHG | RESPIRATION RATE: 18 BRPM | OXYGEN SATURATION: 99 % | BODY MASS INDEX: 36.68 KG/M2 | HEART RATE: 55 BPM | HEIGHT: 68 IN | WEIGHT: 242 LBS

## 2019-09-24 DIAGNOSIS — E66.01 MORBID OBESITY DUE TO EXCESS CALORIES (HCC): Primary | ICD-10-CM

## 2019-09-24 DIAGNOSIS — R11.0 CHRONIC NAUSEA: ICD-10-CM

## 2019-09-24 DIAGNOSIS — K81.9 ACALCULOUS CHOLECYSTITIS: ICD-10-CM

## 2019-09-24 PROCEDURE — 99214 OFFICE O/P EST MOD 30 MIN: CPT | Performed by: SURGERY

## 2019-09-24 RX ORDER — ACETAMINOPHEN 500 MG
1000 TABLET ORAL ONCE
Status: CANCELLED | OUTPATIENT
Start: 2019-09-24 | End: 2019-09-24

## 2019-09-24 RX ORDER — GABAPENTIN 100 MG/1
600 CAPSULE ORAL ONCE
Status: CANCELLED | OUTPATIENT
Start: 2019-09-24 | End: 2019-09-24

## 2019-09-24 RX ORDER — SCOLOPAMINE TRANSDERMAL SYSTEM 1 MG/1
1 PATCH, EXTENDED RELEASE TRANSDERMAL ONCE
Status: CANCELLED | OUTPATIENT
Start: 2019-09-24 | End: 2019-09-24

## 2019-09-24 RX ORDER — SODIUM CHLORIDE 0.9 % (FLUSH) 0.9 %
3 SYRINGE (ML) INJECTION EVERY 12 HOURS SCHEDULED
Status: CANCELLED | OUTPATIENT
Start: 2019-09-24

## 2019-09-24 RX ORDER — CHLORHEXIDINE GLUCONATE 0.12 MG/ML
30 RINSE ORAL
Status: CANCELLED | OUTPATIENT
Start: 2019-09-24 | End: 2019-09-24

## 2019-09-24 RX ORDER — SODIUM CHLORIDE, SODIUM LACTATE, POTASSIUM CHLORIDE, CALCIUM CHLORIDE 600; 310; 30; 20 MG/100ML; MG/100ML; MG/100ML; MG/100ML
150 INJECTION, SOLUTION INTRAVENOUS CONTINUOUS
Status: CANCELLED | OUTPATIENT
Start: 2019-09-24

## 2019-09-24 RX ORDER — SODIUM CHLORIDE 0.9 % (FLUSH) 0.9 %
3-10 SYRINGE (ML) INJECTION AS NEEDED
Status: CANCELLED | OUTPATIENT
Start: 2019-09-24

## 2019-09-24 RX ORDER — PANTOPRAZOLE SODIUM 40 MG/10ML
40 INJECTION, POWDER, LYOPHILIZED, FOR SOLUTION INTRAVENOUS ONCE
Status: CANCELLED | OUTPATIENT
Start: 2019-09-24 | End: 2019-09-24

## 2019-09-25 PROBLEM — E66.01 MORBID OBESITY DUE TO EXCESS CALORIES (HCC): Status: ACTIVE | Noted: 2019-09-25

## 2019-10-05 NOTE — H&P (VIEW-ONLY)
[facility] BARIATRIC SURGERY  [address]  [facility] [address]  [phone]      Patient  Name:  Danielle Steward  :  1970      Date of Visit: 19    Chief Complaint:  weight gain; unable to maintain weight loss.  Evaluate for possible weight loss surgery    History of Present Illness:  Danielle Steward is a 48 y.o. female who presents today for evaluation, education and consultation regarding weight loss surgery.  Since last seen 2019 she has lost 4 pounds.  The patient returns for final visit prior to LSG.  Original intake evaluation Dr. Danielson dated 7/3/2019 reviewed.  The patient has had issues with morbid obesity for years and only temporary success with non-surgical methods of weight loss.      he patient is seeking LSG to help with the morbid obesity related conditions of chronic acalculous cholecystitis and chronic nausea, chronic kidney disease, hypertriglyceridemia, gastroparesis, chronic back pain, severe GE reflux disease, glaucoma, hypertension, osteoarthritis, prediabetes, suspected obstructive sleep apnea, vitamin D deficiency.    48-year-old morbidly obese white female from [facility].  The patient is aware of the special circumstances of paying out of pocket for this procedure and BLIS selective complication protectionand still wishes to proceed.  She also desires concomitant cholecystectomy she is had two CCK HIDA scans in the past with reproduction of her symptoms and has chronic postprandial nausea.        Past Medical History:   Diagnosis Date   • Acalculous cholecystitis    • Back pain    • Blindness and low vision     right eye only, legally blind in right eye, has peripheral vision only   • Chronic kidney disease, stage II (mild)    • Delayed gastric emptying     suspected given large amount of retained food on EGD.   • Dysphagia     occasional   • Gastroparesis    • GERD (gastroesophageal reflux disease)     severe,  on Dexilant bid and Zantac daily, controls. EGD by Dr. Agosto in [place] showed GE junction 40 mg, findings of large amount of retained food.     • Glaucoma (increased eye pressure)     per pt not glaucoma yet, but hx of increased pressure in left eye, which is her only functional eye   • Hypertension    • Hypertriglyceridemia    •  (normal spontaneous vaginal delivery)     x 2, bedrest for 2 weeks with the second for preeclampsia   • Osteoarthritis    • Prediabetes    • Sleep disturbance     snoring, has not been tested for SUMAYA   • Vitamin D deficiency      Past Surgical History:   Procedure Laterality Date   • CATARACT EXTRACTION Right    • CATARACT EXTRACTION WITH INTRAOCULAR LENS IMPLANT Right    • HYSTEROSCOPY ENDOMETRIAL ABLATION     • LAPAROSCOPIC TUBAL LIGATION     • LASIK     • STRABISMUS SURGERY Right        Allergies   Allergen Reactions   • Methylprednisolone Other (See Comments)     Avoids due to increased pressure in eye/glaucoma risk per her optometrist       Current Outpatient Medications:   •  atenolol (TENORMIN) 50 MG tablet, Take 50 mg by mouth Daily., Disp: , Rfl:   •  chlorthalidone (HYGROTON) 25 MG tablet, Take 25 mg by mouth Daily., Disp: , Rfl:   •  dexlansoprazole (DEXILANT) 30 MG capsule, Take 30 mg by mouth Daily., Disp: , Rfl:   •  Empagliflozin (JARDIANCE) 10 MG tablet, Take  by mouth., Disp: , Rfl:   •  metFORMIN (GLUCOPHAGE) 850 MG tablet, Take 850 mg by mouth Daily., Disp: , Rfl:   •  Multiple Vitamins-Minerals (PRESERVISION AREDS 2 PO), Take  by mouth., Disp: , Rfl:   •  raNITIdine (ZANTAC) 150 MG tablet, Take 150 mg by mouth 2 (Two) Times a Day., Disp: , Rfl:   •  vitamin D (ERGOCALCIFEROL) 58496 units capsule capsule, Take 50,000 Units by mouth 1 (One) Time Per Week., Disp: , Rfl:     Social History     Socioeconomic History   • Marital status:      Spouse name: Not on file   • Number of children: Not on file   • Years of education: Not on file   • Highest  education level: Not on file   Occupational History   • 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       Family History   Problem Relation Age of Onset   • Heart attack Maternal Grandfather 75       Review of Systems   Constitutional: Positive for fatigue and unexpected weight gain. Negative for chills, diaphoresis, fever and unexpected weight loss.   HENT: Negative for congestion and facial swelling.    Eyes: Negative for blurred vision, double vision and discharge.   Respiratory: Negative for chest tightness, shortness of breath and stridor.    Cardiovascular: Negative for chest pain, palpitations and leg swelling.   Gastrointestinal: Positive for nausea and GERD. Negative for blood in stool.   Endocrine: Negative for polydipsia.   Genitourinary: Negative for hematuria.   Musculoskeletal: Positive for arthralgias.   Skin: Negative for color change.   Allergic/Immunologic: Negative for immunocompromised state.   Neurological: Negative for confusion.   Psychiatric/Behavioral: Negative for self-injury.       I have reviewed the ROS and confirm that it's accurate today.    Physical Exam:  Vital Signs:  Weight: 110 kg (242 lb)   Body mass index is 37.34 kg/m².  Temp: 96.6 °F (35.9 °C)   Heart Rate: 55   BP: 122/84     Physical Exam   Constitutional: She is oriented to person, place, and time. She appears well-developed and well-nourished.   HENT:   Head: Normocephalic and atraumatic.   Nose: Nose normal.   Eyes: Conjunctivae and EOM are normal. Pupils are equal, round, and reactive to light.   Neck: Normal range of motion. Neck supple. Carotid bruit is not present. No  tracheal deviation present. No thyromegaly present.   Cardiovascular: Normal rate, regular rhythm and normal heart sounds.   Pulmonary/Chest: Effort normal and breath sounds normal. No respiratory distress.   Abdominal: Soft. She exhibits no distension. There is no hepatosplenomegaly. There is no tenderness.   Umbo btl scar   Musculoskeletal: Normal range of motion. She exhibits no edema or deformity.   Neurological: She is alert and oriented to person, place, and time. No cranial nerve deficit. Coordination normal.   Skin: Skin is warm and dry. No rash noted.   Psychiatric: She has a normal mood and affect. Her behavior is normal. Judgment and thought content normal.   Vitals reviewed.      Patient Active Problem List   Diagnosis   • CARTER (dyspnea on exertion)   • Preoperative clearance   • Sinus bradycardia   • Abnormal EKG   • Class 2 obesity due to excess calories without serious comorbidity in adult   • Morbid obesity due to excess calories (CMS/HCC)       Assessment:    Danielle Steward is a 48 y.o. year old female with medically complicated obesity.    Weight loss surgery is deemed medically necessary given the following obesity related comorbidities including  chronic acalculous cholecystitis and chronic nausea, chronic kidney disease, hypertriglyceridemia, gastroparesis, chronic back pain, severe GE reflux disease, glaucoma, hypertension, osteoarthritis, prediabetes, suspected obstructive sleep apnea, vitamin D deficiency with current Weight: 110 kg (242 lb) and Body mass index is 37.34 kg/m²..    Encounter Diagnosis   Name Primary?   • Morbid obesity due to excess calories (CMS/HCC) Yes      Patient is aware that surgery is a tool, and that weight loss is not guaranteed but only seen in the context of appropriate use, follow up and exercise.    The patient was present for an approximately a 2.5 hour discussion of the purpose of weight loss surgery, how WLS is a tool to assist in achieving weight loss  goals, the most common complications and how best to avoid them, and the strategies for short and long term weight loss.  Ample opportunity to discuss questions was available both in group and during the time of individual examination.    I reviewed her Pranav report which is negative.  Chest x-ray dated 9/19/2019 no active process.  EKG dated 8/6/2019 sinus bradycardia rate 55.  CBC and CMP dated 9/19/2019 normal except for glucose of 134 chloride of 96 CO2 32.6 GFR of 60 of note BUN 20 creatinine 0.99 CBC unremarkable except for decrease RDW.  Psychological evaluation dated 7/3/2019 Flako Baxter, PhD appropriate candidate.  Dietitian evaluation Angie Kodak dated 7/3/2019.  EGD dated 2/27/2018 Bobby bhagat MD no evidence of hiatal hernia retained food noted consistent with gastroparesis.  Biopsies of the antrum showed no H. pylori.  No distal esophageal biopsies were taken.  Gastric emptying study dated 7/17/2019 showing diminished gastric emptying with 42% at 90 minutes.  Negative stool H. pylori test dated 9/9/2019.  Cardiac clearance dated 8/29/2019 Dr. Camacho showing a normal stress test and echocardiogram.  He notes triglycerides of 328 high VLDL at 66 at that time BUN 17 creatinine 1.18 TSH normal.  Please see scanned records that I have reviewed and signed off on today.  All of this in addition to the patient's unique history and exam has been taken into consideration in determining their appropriate candidacy for weight loss surgery.    Complications  of laparoscopic/possible robotic gastric sleeve were discussed. The patient is well aware of the potential complications of surgery that include but not limited to bleeding, infections, deep venous thrombosis, pulmonary embolism, pulmonary complications such as pneumonia, cardiac events, hernias, small bowel obstruction, damage to the spleen or other organs, bowel injury, disfiguring scars, failure to lose weight, need for additional surgery, conversion to  "an open procedure, and death. Patient is also aware of complications which apply in this particular procedure that can include but are not limited to a \"leak\" at the staple line which in some instances may require conversion to gastric bypass.    The patient is aware if a hiatal hernia is encountered, it likely will be repaired.  R/B/A Rx to hiatal hernia repair were discussed as outlined in our long consent form.  Briefly risks in addition to those for LSG include recurrent hernia, BEVERLY, dysphagia, esophageal injury, pneumothorax, injury to the vagus nerves, injury to the thoracic duct, aorta or vena cava.    I discussed avoiding all tobacco products and second hand smoke at least 2 weeks pre-operatively and 6 weeks post-operatively to minimize the risk of sleeve leak.  This included discussing the importance of avoiding even secondhand smoke as the risk of leak is increased.  Examples discussed:  I made it very clear that the patient understands they should avoid even riding in a car where someone has previously smoked in the last 2 weeks, living in a house where someone smokes (even if it's in a separate room/patio/attached garage, etc.) we discussed that they should not have a conversation with a group of people who are smoking even if it's outside.  They can be around wood burning fires and barbecue.  I told them I do not know if marijuana has a same effects but my overall recommendation is to avoid it for 2 weeks prior in 6 weeks after surgery.  They also are aware that nicotine may also increase the risk of leak and I strongly encouraged him to avoid that as well for 2 weeks prior in 6 weeks after surgery.    Discussed the risks, benefits and alternative therapies at great length as outlined in our extensive consent forms, consent videos, and educational teaching process under the direction of the center's .    A copy of the patient's signed informed consent is on file.    R/b/a rx discussed " including but not limited to bleeding, infection, bile leak, bile duct injury, bowel injury, pulm complications, venothromboembolic events, death etc and wishes to proceed with concomitant lap radha.  Aware may not alleviate symptoms and further work up may be warranted.    R/B/A Rx discussed to postop anticoagulation incl but not limited to bleeding, drug reaction, venothromboembolic events, etc. and the patient declined.        Plan: After evaluation today I think the patient is a reasonable candidate for laparoscopic sleeve gastrectomy and concomitant cholecystectomy.  Self-pay issues as above.  Severe reflux symptoms likely related to her gastroparesis and untreated suspected obstructive sleep apnea.  No hiatal hernia on preoperative outlying EGD.  If a hiatal hernia is encountered at the time of OR it will be repaired as above.  Other issues include chronic nausea, hyperlipidemia back pain, glaucoma, hypertension osteoarthritis, prediabetes and vitamin D deficiency.        Dave Morrissey MD

## 2019-10-05 NOTE — PROGRESS NOTES
[facility] BARIATRIC SURGERY  [address]  [facility] [address]  [phone]      Patient  Name:  Danielle Steward  :  1970      Date of Visit: 19    Chief Complaint:  weight gain; unable to maintain weight loss.  Evaluate for possible weight loss surgery    History of Present Illness:  Danielle Steward is a 48 y.o. female who presents today for evaluation, education and consultation regarding weight loss surgery.  Since last seen 2019 she has lost 4 pounds.  The patient returns for final visit prior to LSG.  Original intake evaluation Dr. Danielson dated 7/3/2019 reviewed.  The patient has had issues with morbid obesity for years and only temporary success with non-surgical methods of weight loss.      he patient is seeking LSG to help with the morbid obesity related conditions of chronic acalculous cholecystitis and chronic nausea, chronic kidney disease, hypertriglyceridemia, gastroparesis, chronic back pain, severe GE reflux disease, glaucoma, hypertension, osteoarthritis, prediabetes, suspected obstructive sleep apnea, vitamin D deficiency.    48-year-old morbidly obese white female from [facility].  The patient is aware of the special circumstances of paying out of pocket for this procedure and BLIS selective complication protectionand still wishes to proceed.  She also desires concomitant cholecystectomy she is had two CCK HIDA scans in the past with reproduction of her symptoms and has chronic postprandial nausea.        Past Medical History:   Diagnosis Date   • Acalculous cholecystitis    • Back pain    • Blindness and low vision     right eye only, legally blind in right eye, has peripheral vision only   • Chronic kidney disease, stage II (mild)    • Delayed gastric emptying     suspected given large amount of retained food on EGD.   • Dysphagia     occasional   • Gastroparesis    • GERD (gastroesophageal reflux disease)     severe,  on Dexilant bid and Zantac daily, controls. EGD by Dr. Agosto in [place] showed GE junction 40 mg, findings of large amount of retained food.     • Glaucoma (increased eye pressure)     per pt not glaucoma yet, but hx of increased pressure in left eye, which is her only functional eye   • Hypertension    • Hypertriglyceridemia    •  (normal spontaneous vaginal delivery)     x 2, bedrest for 2 weeks with the second for preeclampsia   • Osteoarthritis    • Prediabetes    • Sleep disturbance     snoring, has not been tested for SUMAYA   • Vitamin D deficiency      Past Surgical History:   Procedure Laterality Date   • CATARACT EXTRACTION Right    • CATARACT EXTRACTION WITH INTRAOCULAR LENS IMPLANT Right    • HYSTEROSCOPY ENDOMETRIAL ABLATION     • LAPAROSCOPIC TUBAL LIGATION     • LASIK     • STRABISMUS SURGERY Right        Allergies   Allergen Reactions   • Methylprednisolone Other (See Comments)     Avoids due to increased pressure in eye/glaucoma risk per her optometrist       Current Outpatient Medications:   •  atenolol (TENORMIN) 50 MG tablet, Take 50 mg by mouth Daily., Disp: , Rfl:   •  chlorthalidone (HYGROTON) 25 MG tablet, Take 25 mg by mouth Daily., Disp: , Rfl:   •  dexlansoprazole (DEXILANT) 30 MG capsule, Take 30 mg by mouth Daily., Disp: , Rfl:   •  Empagliflozin (JARDIANCE) 10 MG tablet, Take  by mouth., Disp: , Rfl:   •  metFORMIN (GLUCOPHAGE) 850 MG tablet, Take 850 mg by mouth Daily., Disp: , Rfl:   •  Multiple Vitamins-Minerals (PRESERVISION AREDS 2 PO), Take  by mouth., Disp: , Rfl:   •  raNITIdine (ZANTAC) 150 MG tablet, Take 150 mg by mouth 2 (Two) Times a Day., Disp: , Rfl:   •  vitamin D (ERGOCALCIFEROL) 65826 units capsule capsule, Take 50,000 Units by mouth 1 (One) Time Per Week., Disp: , Rfl:     Social History     Socioeconomic History   • Marital status:      Spouse name: Not on file   • Number of children: Not on file   • Years of education: Not on file   • Highest  education level: Not on file   Occupational History   • 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       Family History   Problem Relation Age of Onset   • Heart attack Maternal Grandfather 75       Review of Systems   Constitutional: Positive for fatigue and unexpected weight gain. Negative for chills, diaphoresis, fever and unexpected weight loss.   HENT: Negative for congestion and facial swelling.    Eyes: Negative for blurred vision, double vision and discharge.   Respiratory: Negative for chest tightness, shortness of breath and stridor.    Cardiovascular: Negative for chest pain, palpitations and leg swelling.   Gastrointestinal: Positive for nausea and GERD. Negative for blood in stool.   Endocrine: Negative for polydipsia.   Genitourinary: Negative for hematuria.   Musculoskeletal: Positive for arthralgias.   Skin: Negative for color change.   Allergic/Immunologic: Negative for immunocompromised state.   Neurological: Negative for confusion.   Psychiatric/Behavioral: Negative for self-injury.       I have reviewed the ROS and confirm that it's accurate today.    Physical Exam:  Vital Signs:  Weight: 110 kg (242 lb)   Body mass index is 37.34 kg/m².  Temp: 96.6 °F (35.9 °C)   Heart Rate: 55   BP: 122/84     Physical Exam   Constitutional: She is oriented to person, place, and time. She appears well-developed and well-nourished.   HENT:   Head: Normocephalic and atraumatic.   Nose: Nose normal.   Eyes: Conjunctivae and EOM are normal. Pupils are equal, round, and reactive to light.   Neck: Normal range of motion. Neck supple. Carotid bruit is not present. No  tracheal deviation present. No thyromegaly present.   Cardiovascular: Normal rate, regular rhythm and normal heart sounds.   Pulmonary/Chest: Effort normal and breath sounds normal. No respiratory distress.   Abdominal: Soft. She exhibits no distension. There is no hepatosplenomegaly. There is no tenderness.   Umbo btl scar   Musculoskeletal: Normal range of motion. She exhibits no edema or deformity.   Neurological: She is alert and oriented to person, place, and time. No cranial nerve deficit. Coordination normal.   Skin: Skin is warm and dry. No rash noted.   Psychiatric: She has a normal mood and affect. Her behavior is normal. Judgment and thought content normal.   Vitals reviewed.      Patient Active Problem List   Diagnosis   • CARTER (dyspnea on exertion)   • Preoperative clearance   • Sinus bradycardia   • Abnormal EKG   • Class 2 obesity due to excess calories without serious comorbidity in adult   • Morbid obesity due to excess calories (CMS/HCC)       Assessment:    Danielle Steward is a 48 y.o. year old female with medically complicated obesity.    Weight loss surgery is deemed medically necessary given the following obesity related comorbidities including  chronic acalculous cholecystitis and chronic nausea, chronic kidney disease, hypertriglyceridemia, gastroparesis, chronic back pain, severe GE reflux disease, glaucoma, hypertension, osteoarthritis, prediabetes, suspected obstructive sleep apnea, vitamin D deficiency with current Weight: 110 kg (242 lb) and Body mass index is 37.34 kg/m²..    Encounter Diagnosis   Name Primary?   • Morbid obesity due to excess calories (CMS/HCC) Yes      Patient is aware that surgery is a tool, and that weight loss is not guaranteed but only seen in the context of appropriate use, follow up and exercise.    The patient was present for an approximately a 2.5 hour discussion of the purpose of weight loss surgery, how WLS is a tool to assist in achieving weight loss  goals, the most common complications and how best to avoid them, and the strategies for short and long term weight loss.  Ample opportunity to discuss questions was available both in group and during the time of individual examination.    I reviewed her Pranav report which is negative.  Chest x-ray dated 9/19/2019 no active process.  EKG dated 8/6/2019 sinus bradycardia rate 55.  CBC and CMP dated 9/19/2019 normal except for glucose of 134 chloride of 96 CO2 32.6 GFR of 60 of note BUN 20 creatinine 0.99 CBC unremarkable except for decrease RDW.  Psychological evaluation dated 7/3/2019 Flako Baxter, PhD appropriate candidate.  Dietitian evaluation Angie Kodak dated 7/3/2019.  EGD dated 2/27/2018 Bobby bhagat MD no evidence of hiatal hernia retained food noted consistent with gastroparesis.  Biopsies of the antrum showed no H. pylori.  No distal esophageal biopsies were taken.  Gastric emptying study dated 7/17/2019 showing diminished gastric emptying with 42% at 90 minutes.  Negative stool H. pylori test dated 9/9/2019.  Cardiac clearance dated 8/29/2019 Dr. Camacho showing a normal stress test and echocardiogram.  He notes triglycerides of 328 high VLDL at 66 at that time BUN 17 creatinine 1.18 TSH normal.  Please see scanned records that I have reviewed and signed off on today.  All of this in addition to the patient's unique history and exam has been taken into consideration in determining their appropriate candidacy for weight loss surgery.    Complications  of laparoscopic/possible robotic gastric sleeve were discussed. The patient is well aware of the potential complications of surgery that include but not limited to bleeding, infections, deep venous thrombosis, pulmonary embolism, pulmonary complications such as pneumonia, cardiac events, hernias, small bowel obstruction, damage to the spleen or other organs, bowel injury, disfiguring scars, failure to lose weight, need for additional surgery, conversion to  "an open procedure, and death. Patient is also aware of complications which apply in this particular procedure that can include but are not limited to a \"leak\" at the staple line which in some instances may require conversion to gastric bypass.    The patient is aware if a hiatal hernia is encountered, it likely will be repaired.  R/B/A Rx to hiatal hernia repair were discussed as outlined in our long consent form.  Briefly risks in addition to those for LSG include recurrent hernia, BEVERLY, dysphagia, esophageal injury, pneumothorax, injury to the vagus nerves, injury to the thoracic duct, aorta or vena cava.    I discussed avoiding all tobacco products and second hand smoke at least 2 weeks pre-operatively and 6 weeks post-operatively to minimize the risk of sleeve leak.  This included discussing the importance of avoiding even secondhand smoke as the risk of leak is increased.  Examples discussed:  I made it very clear that the patient understands they should avoid even riding in a car where someone has previously smoked in the last 2 weeks, living in a house where someone smokes (even if it's in a separate room/patio/attached garage, etc.) we discussed that they should not have a conversation with a group of people who are smoking even if it's outside.  They can be around wood burning fires and barbecue.  I told them I do not know if marijuana has a same effects but my overall recommendation is to avoid it for 2 weeks prior in 6 weeks after surgery.  They also are aware that nicotine may also increase the risk of leak and I strongly encouraged him to avoid that as well for 2 weeks prior in 6 weeks after surgery.    Discussed the risks, benefits and alternative therapies at great length as outlined in our extensive consent forms, consent videos, and educational teaching process under the direction of the center's .    A copy of the patient's signed informed consent is on file.    R/b/a rx discussed " including but not limited to bleeding, infection, bile leak, bile duct injury, bowel injury, pulm complications, venothromboembolic events, death etc and wishes to proceed with concomitant lap radha.  Aware may not alleviate symptoms and further work up may be warranted.    R/B/A Rx discussed to postop anticoagulation incl but not limited to bleeding, drug reaction, venothromboembolic events, etc. and the patient declined.        Plan: After evaluation today I think the patient is a reasonable candidate for laparoscopic sleeve gastrectomy and concomitant cholecystectomy.  Self-pay issues as above.  Severe reflux symptoms likely related to her gastroparesis and untreated suspected obstructive sleep apnea.  No hiatal hernia on preoperative outlying EGD.  If a hiatal hernia is encountered at the time of OR it will be repaired as above.  Other issues include chronic nausea, hyperlipidemia back pain, glaucoma, hypertension osteoarthritis, prediabetes and vitamin D deficiency.        Dave Morrissey MD

## 2019-10-10 ENCOUNTER — HOSPITAL ENCOUNTER (OUTPATIENT)
Dept: NUCLEAR MEDICINE | Facility: HOSPITAL | Age: 49
Discharge: HOME | End: 2019-10-10

## 2019-10-10 ENCOUNTER — APPOINTMENT (OUTPATIENT)
Dept: PREADMISSION TESTING | Facility: HOSPITAL | Age: 49
End: 2019-10-10

## 2019-10-10 DIAGNOSIS — R11.0 CHRONIC NAUSEA: ICD-10-CM

## 2019-10-10 DIAGNOSIS — K81.9 ACALCULOUS CHOLECYSTITIS: ICD-10-CM

## 2019-10-10 PROCEDURE — A9537 TC99M MEBROFENIN: HCPCS | Performed by: PHYSICIAN ASSISTANT

## 2019-10-10 PROCEDURE — 78227 HEPATOBIL SYST IMAGE W/DRUG: CPT

## 2019-10-10 PROCEDURE — 0 TECHNETIUM TC 99M MEBROFENIN KIT: Performed by: PHYSICIAN ASSISTANT

## 2019-10-10 PROCEDURE — 78227 HEPATOBIL SYST IMAGE W/DRUG: CPT | Performed by: RADIOLOGY

## 2019-10-10 PROCEDURE — 25010000002 SINCALIDE PER 5 MCG: Performed by: PHYSICIAN ASSISTANT

## 2019-10-10 RX ORDER — KIT FOR THE PREPARATION OF TECHNETIUM TC 99M MEBROFENIN 45 MG/10ML
1 INJECTION, POWDER, LYOPHILIZED, FOR SOLUTION INTRAVENOUS
Status: COMPLETED | OUTPATIENT
Start: 2019-10-10 | End: 2019-10-10

## 2019-10-10 RX ADMIN — SINCALIDE 4.4 MCG: 5 INJECTION, POWDER, LYOPHILIZED, FOR SOLUTION INTRAVENOUS at 08:58

## 2019-10-10 RX ADMIN — MEBROFENIN 1 DOSE: 45 INJECTION, POWDER, LYOPHILIZED, FOR SOLUTION INTRAVENOUS at 07:50

## 2019-10-22 ENCOUNTER — APPOINTMENT (OUTPATIENT)
Dept: PREADMISSION TESTING | Facility: HOSPITAL | Age: 49
End: 2019-10-22

## 2019-10-22 ENCOUNTER — ANESTHESIA EVENT (OUTPATIENT)
Dept: PERIOP | Facility: HOSPITAL | Age: 49
End: 2019-10-22

## 2019-10-22 DIAGNOSIS — E66.01 MORBID OBESITY DUE TO EXCESS CALORIES (HCC): ICD-10-CM

## 2019-10-22 LAB
ABO GROUP BLD: NORMAL
BLD GP AB SCN SERPL QL: NEGATIVE
DEPRECATED RDW RBC AUTO: 41.2 FL (ref 37–54)
ERYTHROCYTE [DISTWIDTH] IN BLOOD BY AUTOMATED COUNT: 11.9 % (ref 12.3–15.4)
HBA1C MFR BLD: 6.3 % (ref 4.8–5.6)
HCT VFR BLD AUTO: 47.6 % (ref 34–46.6)
HGB BLD-MCNC: 15.6 G/DL (ref 12–15.9)
MCH RBC QN AUTO: 31.2 PG (ref 26.6–33)
MCHC RBC AUTO-ENTMCNC: 32.8 G/DL (ref 31.5–35.7)
MCV RBC AUTO: 95.2 FL (ref 79–97)
PLATELET # BLD AUTO: 315 10*3/MM3 (ref 140–450)
PMV BLD AUTO: 10.4 FL (ref 6–12)
POTASSIUM BLD-SCNC: 3.5 MMOL/L (ref 3.5–5.2)
RBC # BLD AUTO: 5 10*6/MM3 (ref 3.77–5.28)
RH BLD: POSITIVE
T&S EXPIRATION DATE: NORMAL
WBC NRBC COR # BLD: 10.21 10*3/MM3 (ref 3.4–10.8)

## 2019-10-22 PROCEDURE — 83036 HEMOGLOBIN GLYCOSYLATED A1C: CPT | Performed by: ANESTHESIOLOGY

## 2019-10-22 PROCEDURE — 86901 BLOOD TYPING SEROLOGIC RH(D): CPT | Performed by: SURGERY

## 2019-10-22 PROCEDURE — 86900 BLOOD TYPING SEROLOGIC ABO: CPT | Performed by: SURGERY

## 2019-10-22 PROCEDURE — 84132 ASSAY OF SERUM POTASSIUM: CPT | Performed by: ANESTHESIOLOGY

## 2019-10-22 PROCEDURE — 86850 RBC ANTIBODY SCREEN: CPT | Performed by: SURGERY

## 2019-10-22 PROCEDURE — 36415 COLL VENOUS BLD VENIPUNCTURE: CPT

## 2019-10-22 PROCEDURE — 85027 COMPLETE CBC AUTOMATED: CPT | Performed by: ANESTHESIOLOGY

## 2019-10-22 RX ORDER — FAMOTIDINE 10 MG/ML
20 INJECTION, SOLUTION INTRAVENOUS ONCE
Status: CANCELLED | OUTPATIENT
Start: 2019-10-22 | End: 2019-10-22

## 2019-10-22 RX ORDER — FAMOTIDINE 20 MG/1
20 TABLET, FILM COATED ORAL ONCE
Status: CANCELLED | OUTPATIENT
Start: 2019-10-22 | End: 2019-10-22

## 2019-10-23 ENCOUNTER — ANESTHESIA (OUTPATIENT)
Dept: PERIOP | Facility: HOSPITAL | Age: 49
End: 2019-10-23

## 2019-10-23 ENCOUNTER — HOSPITAL ENCOUNTER (INPATIENT)
Facility: HOSPITAL | Age: 49
LOS: 2 days | Discharge: HOME | End: 2019-10-25
Attending: SURGERY | Admitting: SURGERY

## 2019-10-23 DIAGNOSIS — E66.01 MORBID OBESITY DUE TO EXCESS CALORIES (HCC): ICD-10-CM

## 2019-10-23 LAB
B-HCG UR QL: NEGATIVE
GLUCOSE BLDC GLUCOMTR-MCNC: 144 MG/DL (ref 70–130)
GLUCOSE BLDC GLUCOMTR-MCNC: 153 MG/DL (ref 70–130)
GLUCOSE BLDC GLUCOMTR-MCNC: 154 MG/DL (ref 70–130)
INTERNAL NEGATIVE CONTROL: NORMAL
INTERNAL POSITIVE CONTROL: REACTIVE
Lab: NORMAL

## 2019-10-23 PROCEDURE — 25010000002 FENTANYL CITRATE (PF) 100 MCG/2ML SOLUTION: Performed by: NURSE ANESTHETIST, CERTIFIED REGISTERED

## 2019-10-23 PROCEDURE — 25010000002 PROPOFOL 10 MG/ML EMULSION: Performed by: NURSE ANESTHETIST, CERTIFIED REGISTERED

## 2019-10-23 PROCEDURE — 81025 URINE PREGNANCY TEST: CPT | Performed by: SURGERY

## 2019-10-23 PROCEDURE — 47562 LAPAROSCOPIC CHOLECYSTECTOMY: CPT | Performed by: SURGERY

## 2019-10-23 PROCEDURE — 25010000002 NEOSTIGMINE 10 MG/10ML SOLUTION: Performed by: NURSE ANESTHETIST, CERTIFIED REGISTERED

## 2019-10-23 PROCEDURE — 25010000002 BUPRENORPHINE PER 0.1 MG: Performed by: ANESTHESIOLOGY

## 2019-10-23 PROCEDURE — 88304 TISSUE EXAM BY PATHOLOGIST: CPT | Performed by: SURGERY

## 2019-10-23 PROCEDURE — 0FT44ZZ RESECTION OF GALLBLADDER, PERCUTANEOUS ENDOSCOPIC APPROACH: ICD-10-PCS | Performed by: SURGERY

## 2019-10-23 PROCEDURE — 25010000002 ONDANSETRON PER 1 MG: Performed by: SURGERY

## 2019-10-23 PROCEDURE — 43775 LAP SLEEVE GASTRECTOMY: CPT | Performed by: SURGERY

## 2019-10-23 PROCEDURE — 63710000001 INSULIN LISPRO (HUMAN) PER 5 UNITS: Performed by: SURGERY

## 2019-10-23 PROCEDURE — 25010000002 PHENYLEPHRINE PER 1 ML: Performed by: NURSE ANESTHETIST, CERTIFIED REGISTERED

## 2019-10-23 PROCEDURE — 25010000002 METOCLOPRAMIDE PER 10 MG: Performed by: SURGERY

## 2019-10-23 PROCEDURE — 0DB64Z3 EXCISION OF STOMACH, PERCUTANEOUS ENDOSCOPIC APPROACH, VERTICAL: ICD-10-PCS | Performed by: SURGERY

## 2019-10-23 PROCEDURE — 88307 TISSUE EXAM BY PATHOLOGIST: CPT | Performed by: SURGERY

## 2019-10-23 PROCEDURE — 25010000002 ENOXAPARIN PER 10 MG: Performed by: SURGERY

## 2019-10-23 PROCEDURE — 25010000003 CEFAZOLIN IN DEXTROSE 2-4 GM/100ML-% SOLUTION: Performed by: SURGERY

## 2019-10-23 PROCEDURE — 82962 GLUCOSE BLOOD TEST: CPT

## 2019-10-23 PROCEDURE — 25010000002 DEXAMETHASONE SODIUM PHOSPHATE 10 MG/ML SOLUTION: Performed by: ANESTHESIOLOGY

## 2019-10-23 PROCEDURE — 94799 UNLISTED PULMONARY SVC/PX: CPT

## 2019-10-23 DEVICE — REINFORCED INTELLIGENT RELOAD, FOR USE WITH SIGNIA STAPLING SYSTEM
Type: IMPLANTABLE DEVICE | Site: STOMACH | Status: FUNCTIONAL
Brand: TRI-STAPLE 2.0

## 2019-10-23 DEVICE — BLACK REINFORCED INTELLIGENT RELOAD, FOR USE WITH SIGNIA STAPLING SYSTEM
Type: IMPLANTABLE DEVICE | Site: STOMACH | Status: FUNCTIONAL
Brand: TRI-STAPLE 2.0

## 2019-10-23 RX ORDER — GLYCOPYRROLATE 0.2 MG/ML
INJECTION INTRAMUSCULAR; INTRAVENOUS AS NEEDED
Status: DISCONTINUED | OUTPATIENT
Start: 2019-10-23 | End: 2019-10-23 | Stop reason: SURG

## 2019-10-23 RX ORDER — HYDRALAZINE HYDROCHLORIDE 20 MG/ML
10 INJECTION INTRAMUSCULAR; INTRAVENOUS
Status: DISCONTINUED | OUTPATIENT
Start: 2019-10-23 | End: 2019-10-25 | Stop reason: HOSPADM

## 2019-10-23 RX ORDER — SODIUM CHLORIDE 0.9 % (FLUSH) 0.9 %
3 SYRINGE (ML) INJECTION EVERY 12 HOURS SCHEDULED
Status: DISCONTINUED | OUTPATIENT
Start: 2019-10-23 | End: 2019-10-23 | Stop reason: HOSPADM

## 2019-10-23 RX ORDER — ONDANSETRON 4 MG/1
4 TABLET, FILM COATED ORAL EVERY 6 HOURS PRN
Status: DISCONTINUED | OUTPATIENT
Start: 2019-10-24 | End: 2019-10-25 | Stop reason: HOSPADM

## 2019-10-23 RX ORDER — CYANOCOBALAMIN 1000 UG/ML
1000 INJECTION, SOLUTION INTRAMUSCULAR; SUBCUTANEOUS ONCE
Status: COMPLETED | OUTPATIENT
Start: 2019-10-24 | End: 2019-10-24

## 2019-10-23 RX ORDER — HYDROCODONE BITARTRATE AND ACETAMINOPHEN 7.5; 325 MG/1; MG/1
1 TABLET ORAL EVERY 4 HOURS PRN
Status: DISCONTINUED | OUTPATIENT
Start: 2019-10-23 | End: 2019-10-25 | Stop reason: HOSPADM

## 2019-10-23 RX ORDER — ROCURONIUM BROMIDE 10 MG/ML
INJECTION, SOLUTION INTRAVENOUS AS NEEDED
Status: DISCONTINUED | OUTPATIENT
Start: 2019-10-23 | End: 2019-10-23 | Stop reason: SURG

## 2019-10-23 RX ORDER — FENTANYL CITRATE 50 UG/ML
INJECTION, SOLUTION INTRAMUSCULAR; INTRAVENOUS AS NEEDED
Status: DISCONTINUED | OUTPATIENT
Start: 2019-10-23 | End: 2019-10-23 | Stop reason: SURG

## 2019-10-23 RX ORDER — MORPHINE SULFATE 4 MG/ML
4 INJECTION, SOLUTION INTRAMUSCULAR; INTRAVENOUS
Status: DISCONTINUED | OUTPATIENT
Start: 2019-10-23 | End: 2019-10-25 | Stop reason: HOSPADM

## 2019-10-23 RX ORDER — PROPOFOL 10 MG/ML
VIAL (ML) INTRAVENOUS AS NEEDED
Status: DISCONTINUED | OUTPATIENT
Start: 2019-10-23 | End: 2019-10-23 | Stop reason: SURG

## 2019-10-23 RX ORDER — PROMETHAZINE HYDROCHLORIDE 25 MG/ML
12.5 INJECTION, SOLUTION INTRAMUSCULAR; INTRAVENOUS EVERY 6 HOURS PRN
Status: DISCONTINUED | OUTPATIENT
Start: 2019-10-23 | End: 2019-10-25 | Stop reason: HOSPADM

## 2019-10-23 RX ORDER — METOCLOPRAMIDE HYDROCHLORIDE 5 MG/ML
10 INJECTION INTRAMUSCULAR; INTRAVENOUS EVERY 6 HOURS PRN
Status: DISCONTINUED | OUTPATIENT
Start: 2019-10-23 | End: 2019-10-25 | Stop reason: HOSPADM

## 2019-10-23 RX ORDER — ACETAMINOPHEN 500 MG
1000 TABLET ORAL ONCE
Status: COMPLETED | OUTPATIENT
Start: 2019-10-23 | End: 2019-10-23

## 2019-10-23 RX ORDER — FENTANYL CITRATE 50 UG/ML
50 INJECTION, SOLUTION INTRAMUSCULAR; INTRAVENOUS
Status: DISCONTINUED | OUTPATIENT
Start: 2019-10-23 | End: 2019-10-23 | Stop reason: HOSPADM

## 2019-10-23 RX ORDER — PROMETHAZINE HYDROCHLORIDE 12.5 MG/1
12.5 TABLET ORAL EVERY 6 HOURS PRN
Status: DISCONTINUED | OUTPATIENT
Start: 2019-10-23 | End: 2019-10-25 | Stop reason: HOSPADM

## 2019-10-23 RX ORDER — CEFAZOLIN SODIUM 2 G/100ML
2 INJECTION, SOLUTION INTRAVENOUS EVERY 8 HOURS
Status: COMPLETED | OUTPATIENT
Start: 2019-10-23 | End: 2019-10-23

## 2019-10-23 RX ORDER — PANTOPRAZOLE SODIUM 40 MG/10ML
40 INJECTION, POWDER, LYOPHILIZED, FOR SOLUTION INTRAVENOUS
Status: DISCONTINUED | OUTPATIENT
Start: 2019-10-24 | End: 2019-10-25 | Stop reason: HOSPADM

## 2019-10-23 RX ORDER — ATENOLOL 50 MG/1
50 TABLET ORAL DAILY
Status: DISCONTINUED | OUTPATIENT
Start: 2019-10-24 | End: 2019-10-25 | Stop reason: HOSPADM

## 2019-10-23 RX ORDER — SODIUM CHLORIDE AND POTASSIUM CHLORIDE 150; 450 MG/100ML; MG/100ML
125 INJECTION, SOLUTION INTRAVENOUS CONTINUOUS
Status: DISCONTINUED | OUTPATIENT
Start: 2019-10-24 | End: 2019-10-25 | Stop reason: HOSPADM

## 2019-10-23 RX ORDER — PROCHLORPERAZINE MALEATE 10 MG
10 TABLET ORAL EVERY 6 HOURS PRN
Status: DISCONTINUED | OUTPATIENT
Start: 2019-10-23 | End: 2019-10-25 | Stop reason: HOSPADM

## 2019-10-23 RX ORDER — ALPRAZOLAM 0.25 MG/1
0.25 TABLET ORAL ONCE AS NEEDED
Status: DISCONTINUED | OUTPATIENT
Start: 2019-10-23 | End: 2019-10-25 | Stop reason: HOSPADM

## 2019-10-23 RX ORDER — PROPOFOL 10 MG/ML
VIAL (ML) INTRAVENOUS CONTINUOUS PRN
Status: DISCONTINUED | OUTPATIENT
Start: 2019-10-23 | End: 2019-10-23 | Stop reason: SURG

## 2019-10-23 RX ORDER — DIPHENHYDRAMINE HYDROCHLORIDE 50 MG/ML
25 INJECTION INTRAMUSCULAR; INTRAVENOUS EVERY 4 HOURS PRN
Status: DISCONTINUED | OUTPATIENT
Start: 2019-10-23 | End: 2019-10-25 | Stop reason: HOSPADM

## 2019-10-23 RX ORDER — SCOLOPAMINE TRANSDERMAL SYSTEM 1 MG/1
1 PATCH, EXTENDED RELEASE TRANSDERMAL ONCE
Status: DISCONTINUED | OUTPATIENT
Start: 2019-10-23 | End: 2019-10-23

## 2019-10-23 RX ORDER — SODIUM CHLORIDE, SODIUM LACTATE, POTASSIUM CHLORIDE, CALCIUM CHLORIDE 600; 310; 30; 20 MG/100ML; MG/100ML; MG/100ML; MG/100ML
150 INJECTION, SOLUTION INTRAVENOUS CONTINUOUS
Status: DISCONTINUED | OUTPATIENT
Start: 2019-10-23 | End: 2019-10-23 | Stop reason: HOSPADM

## 2019-10-23 RX ORDER — SODIUM CHLORIDE 9 MG/ML
INJECTION, SOLUTION INTRAVENOUS AS NEEDED
Status: DISCONTINUED | OUTPATIENT
Start: 2019-10-23 | End: 2019-10-23 | Stop reason: HOSPADM

## 2019-10-23 RX ORDER — BUPIVACAINE HYDROCHLORIDE 2.5 MG/ML
INJECTION, SOLUTION EPIDURAL; INFILTRATION; INTRACAUDAL
Status: COMPLETED | OUTPATIENT
Start: 2019-10-23 | End: 2019-10-23

## 2019-10-23 RX ORDER — GABAPENTIN 300 MG/1
600 CAPSULE ORAL ONCE
Status: COMPLETED | OUTPATIENT
Start: 2019-10-23 | End: 2019-10-23

## 2019-10-23 RX ORDER — NALOXONE HCL 0.4 MG/ML
0.1 VIAL (ML) INJECTION
Status: DISCONTINUED | OUTPATIENT
Start: 2019-10-23 | End: 2019-10-25 | Stop reason: HOSPADM

## 2019-10-23 RX ORDER — SIMETHICONE 80 MG
80 TABLET,CHEWABLE ORAL 4 TIMES DAILY PRN
Status: DISCONTINUED | OUTPATIENT
Start: 2019-10-23 | End: 2019-10-25 | Stop reason: HOSPADM

## 2019-10-23 RX ORDER — MAGNESIUM HYDROXIDE 1200 MG/15ML
LIQUID ORAL AS NEEDED
Status: DISCONTINUED | OUTPATIENT
Start: 2019-10-23 | End: 2019-10-23 | Stop reason: HOSPADM

## 2019-10-23 RX ORDER — SODIUM CHLORIDE 0.9 % (FLUSH) 0.9 %
3-10 SYRINGE (ML) INJECTION AS NEEDED
Status: DISCONTINUED | OUTPATIENT
Start: 2019-10-23 | End: 2019-10-23 | Stop reason: HOSPADM

## 2019-10-23 RX ORDER — CEFAZOLIN SODIUM 2 G/100ML
2 INJECTION, SOLUTION INTRAVENOUS ONCE
Status: DISCONTINUED | OUTPATIENT
Start: 2019-10-23 | End: 2019-10-23 | Stop reason: HOSPADM

## 2019-10-23 RX ORDER — DEXAMETHASONE SODIUM PHOSPHATE 10 MG/ML
INJECTION, SOLUTION INTRAMUSCULAR; INTRAVENOUS
Status: COMPLETED | OUTPATIENT
Start: 2019-10-23 | End: 2019-10-23

## 2019-10-23 RX ORDER — NICOTINE POLACRILEX 4 MG
15 LOZENGE BUCCAL
Status: DISCONTINUED | OUTPATIENT
Start: 2019-10-23 | End: 2019-10-25 | Stop reason: HOSPADM

## 2019-10-23 RX ORDER — CHLORHEXIDINE GLUCONATE 0.12 MG/ML
30 RINSE ORAL
Status: COMPLETED | OUTPATIENT
Start: 2019-10-23 | End: 2019-10-23

## 2019-10-23 RX ORDER — GABAPENTIN 100 MG/1
100 CAPSULE ORAL 3 TIMES DAILY
Status: DISPENSED | OUTPATIENT
Start: 2019-10-23 | End: 2019-10-25

## 2019-10-23 RX ORDER — ALBUTEROL SULFATE 2.5 MG/3ML
2.5 SOLUTION RESPIRATORY (INHALATION) EVERY 4 HOURS PRN
Status: DISCONTINUED | OUTPATIENT
Start: 2019-10-23 | End: 2019-10-25 | Stop reason: HOSPADM

## 2019-10-23 RX ORDER — SODIUM CHLORIDE 9 MG/ML
150 INJECTION, SOLUTION INTRAVENOUS CONTINUOUS
Status: DISCONTINUED | OUTPATIENT
Start: 2019-10-23 | End: 2019-10-25 | Stop reason: HOSPADM

## 2019-10-23 RX ORDER — HYDROMORPHONE HYDROCHLORIDE 2 MG/1
2 TABLET ORAL EVERY 4 HOURS PRN
Status: DISCONTINUED | OUTPATIENT
Start: 2019-10-23 | End: 2019-10-25 | Stop reason: HOSPADM

## 2019-10-23 RX ORDER — NEOSTIGMINE METHYLSULFATE 1 MG/ML
INJECTION, SOLUTION INTRAVENOUS AS NEEDED
Status: DISCONTINUED | OUTPATIENT
Start: 2019-10-23 | End: 2019-10-23 | Stop reason: SURG

## 2019-10-23 RX ORDER — SODIUM CHLORIDE, SODIUM LACTATE, POTASSIUM CHLORIDE, CALCIUM CHLORIDE 600; 310; 30; 20 MG/100ML; MG/100ML; MG/100ML; MG/100ML
9 INJECTION, SOLUTION INTRAVENOUS CONTINUOUS
Status: DISCONTINUED | OUTPATIENT
Start: 2019-10-23 | End: 2019-10-23

## 2019-10-23 RX ORDER — ONDANSETRON 2 MG/ML
4 INJECTION INTRAMUSCULAR; INTRAVENOUS EVERY 6 HOURS
Status: COMPLETED | OUTPATIENT
Start: 2019-10-23 | End: 2019-10-24

## 2019-10-23 RX ORDER — LIDOCAINE HYDROCHLORIDE 10 MG/ML
INJECTION, SOLUTION EPIDURAL; INFILTRATION; INTRACAUDAL; PERINEURAL AS NEEDED
Status: DISCONTINUED | OUTPATIENT
Start: 2019-10-23 | End: 2019-10-23 | Stop reason: SURG

## 2019-10-23 RX ORDER — HYDROMORPHONE HYDROCHLORIDE 1 MG/ML
0.5 INJECTION, SOLUTION INTRAMUSCULAR; INTRAVENOUS; SUBCUTANEOUS
Status: DISCONTINUED | OUTPATIENT
Start: 2019-10-23 | End: 2019-10-23 | Stop reason: HOSPADM

## 2019-10-23 RX ORDER — LORAZEPAM 1 MG/1
1 TABLET ORAL EVERY 12 HOURS PRN
Status: DISCONTINUED | OUTPATIENT
Start: 2019-10-23 | End: 2019-10-25 | Stop reason: HOSPADM

## 2019-10-23 RX ORDER — LIDOCAINE HYDROCHLORIDE 10 MG/ML
0.5 INJECTION, SOLUTION EPIDURAL; INFILTRATION; INTRACAUDAL; PERINEURAL ONCE AS NEEDED
Status: COMPLETED | OUTPATIENT
Start: 2019-10-23 | End: 2019-10-23

## 2019-10-23 RX ORDER — LORAZEPAM 2 MG/ML
0.5 INJECTION INTRAMUSCULAR EVERY 12 HOURS PRN
Status: DISCONTINUED | OUTPATIENT
Start: 2019-10-23 | End: 2019-10-25 | Stop reason: HOSPADM

## 2019-10-23 RX ORDER — ACETAMINOPHEN 500 MG
1000 TABLET ORAL EVERY 12 HOURS
Status: DISCONTINUED | OUTPATIENT
Start: 2019-10-23 | End: 2019-10-25 | Stop reason: HOSPADM

## 2019-10-23 RX ORDER — DEXTROSE MONOHYDRATE 25 G/50ML
25 INJECTION, SOLUTION INTRAVENOUS
Status: DISCONTINUED | OUTPATIENT
Start: 2019-10-23 | End: 2019-10-25 | Stop reason: HOSPADM

## 2019-10-23 RX ORDER — PANTOPRAZOLE SODIUM 40 MG/10ML
40 INJECTION, POWDER, LYOPHILIZED, FOR SOLUTION INTRAVENOUS ONCE
Status: COMPLETED | OUTPATIENT
Start: 2019-10-23 | End: 2019-10-23

## 2019-10-23 RX ORDER — BUPRENORPHINE HYDROCHLORIDE 0.32 MG/ML
INJECTION INTRAMUSCULAR; INTRAVENOUS
Status: COMPLETED | OUTPATIENT
Start: 2019-10-23 | End: 2019-10-23

## 2019-10-23 RX ORDER — NALOXONE HCL 0.4 MG/ML
0.4 VIAL (ML) INJECTION
Status: DISCONTINUED | OUTPATIENT
Start: 2019-10-23 | End: 2019-10-25 | Stop reason: HOSPADM

## 2019-10-23 RX ADMIN — FENTANYL CITRATE 50 MCG: 0.05 INJECTION, SOLUTION INTRAMUSCULAR; INTRAVENOUS at 10:46

## 2019-10-23 RX ADMIN — ONDANSETRON 4 MG: 2 INJECTION INTRAMUSCULAR; INTRAVENOUS at 20:00

## 2019-10-23 RX ADMIN — EPHEDRINE SULFATE 10 MG: 50 INJECTION INTRAMUSCULAR; INTRAVENOUS; SUBCUTANEOUS at 07:54

## 2019-10-23 RX ADMIN — BUPIVACAINE HYDROCHLORIDE 60 ML: 2.5 INJECTION, SOLUTION EPIDURAL; INFILTRATION; INTRACAUDAL; PERINEURAL at 07:59

## 2019-10-23 RX ADMIN — GABAPENTIN 100 MG: 100 CAPSULE ORAL at 20:00

## 2019-10-23 RX ADMIN — GABAPENTIN 600 MG: 300 CAPSULE ORAL at 06:28

## 2019-10-23 RX ADMIN — SODIUM CHLORIDE 150 ML/HR: 9 INJECTION, SOLUTION INTRAVENOUS at 11:30

## 2019-10-23 RX ADMIN — CHLORHEXIDINE GLUCONATE 0.12% ORAL RINSE 15 ML: 1.2 LIQUID ORAL at 06:28

## 2019-10-23 RX ADMIN — NEOSTIGMINE METHYLSULFATE 4 MG: 1 INJECTION, SOLUTION INTRAVENOUS at 09:22

## 2019-10-23 RX ADMIN — DEXAMETHASONE SODIUM PHOSPHATE 4 MG: 10 INJECTION INTRAMUSCULAR; INTRAVENOUS at 07:59

## 2019-10-23 RX ADMIN — LIDOCAINE HYDROCHLORIDE 0.5 ML: 10 INJECTION, SOLUTION EPIDURAL; INFILTRATION; INTRACAUDAL; PERINEURAL at 06:28

## 2019-10-23 RX ADMIN — SODIUM CHLORIDE 150 ML/HR: 9 INJECTION, SOLUTION INTRAVENOUS at 19:09

## 2019-10-23 RX ADMIN — SCOPALAMINE 1 PATCH: 1 PATCH, EXTENDED RELEASE TRANSDERMAL at 06:28

## 2019-10-23 RX ADMIN — GABAPENTIN 100 MG: 100 CAPSULE ORAL at 17:34

## 2019-10-23 RX ADMIN — GLYCOPYRROLATE 0.6 MG: 0.2 INJECTION, SOLUTION INTRAMUSCULAR; INTRAVENOUS at 09:22

## 2019-10-23 RX ADMIN — CHLORHEXIDINE GLUCONATE 0.12% ORAL RINSE 15 ML: 1.2 LIQUID ORAL at 06:15

## 2019-10-23 RX ADMIN — PROPOFOL 25 MCG/KG/MIN: 10 INJECTION, EMULSION INTRAVENOUS at 07:42

## 2019-10-23 RX ADMIN — CEFAZOLIN SODIUM 2 G: 2 INJECTION, SOLUTION INTRAVENOUS at 13:57

## 2019-10-23 RX ADMIN — INSULIN LISPRO 2 UNITS: 100 INJECTION, SOLUTION INTRAVENOUS; SUBCUTANEOUS at 20:00

## 2019-10-23 RX ADMIN — SODIUM CHLORIDE, POTASSIUM CHLORIDE, SODIUM LACTATE AND CALCIUM CHLORIDE 150 ML/HR: 600; 310; 30; 20 INJECTION, SOLUTION INTRAVENOUS at 06:44

## 2019-10-23 RX ADMIN — ONDANSETRON 4 MG: 2 INJECTION INTRAMUSCULAR; INTRAVENOUS at 13:56

## 2019-10-23 RX ADMIN — ONDANSETRON 4 MG: 2 INJECTION INTRAMUSCULAR; INTRAVENOUS at 09:03

## 2019-10-23 RX ADMIN — FENTANYL CITRATE 50 MCG: 50 INJECTION, SOLUTION INTRAMUSCULAR; INTRAVENOUS at 07:34

## 2019-10-23 RX ADMIN — PHENYLEPHRINE HYDROCHLORIDE 80 MCG: 10 INJECTION INTRAVENOUS at 07:58

## 2019-10-23 RX ADMIN — FENTANYL CITRATE 50 MCG: 50 INJECTION, SOLUTION INTRAMUSCULAR; INTRAVENOUS at 09:25

## 2019-10-23 RX ADMIN — SODIUM CHLORIDE, POTASSIUM CHLORIDE, SODIUM LACTATE AND CALCIUM CHLORIDE 1000 ML: 600; 310; 30; 20 INJECTION, SOLUTION INTRAVENOUS at 06:15

## 2019-10-23 RX ADMIN — BUPRENORPHINE HYDROCHLORIDE 0.3 MG: 0.32 INJECTION INTRAMUSCULAR; INTRAVENOUS at 07:59

## 2019-10-23 RX ADMIN — CEFAZOLIN SODIUM 2 G: 2 INJECTION, SOLUTION INTRAVENOUS at 20:00

## 2019-10-23 RX ADMIN — METOCLOPRAMIDE 10 MG: 5 INJECTION, SOLUTION INTRAMUSCULAR; INTRAVENOUS at 17:39

## 2019-10-23 RX ADMIN — ROCURONIUM BROMIDE 50 MG: 10 INJECTION INTRAVENOUS at 07:34

## 2019-10-23 RX ADMIN — PANTOPRAZOLE SODIUM 40 MG: 40 INJECTION, POWDER, FOR SOLUTION INTRAVENOUS at 06:28

## 2019-10-23 RX ADMIN — ACETAMINOPHEN 1000 MG: 500 TABLET ORAL at 06:28

## 2019-10-23 RX ADMIN — PROPOFOL 200 MG: 10 INJECTION, EMULSION INTRAVENOUS at 07:34

## 2019-10-23 RX ADMIN — ACETAMINOPHEN 1000 MG: 500 TABLET, FILM COATED ORAL at 17:34

## 2019-10-23 RX ADMIN — LIDOCAINE HYDROCHLORIDE 50 MG: 10 INJECTION, SOLUTION EPIDURAL; INFILTRATION; INTRACAUDAL; PERINEURAL at 07:34

## 2019-10-23 NOTE — PLAN OF CARE
Problem: Patient Care Overview  Goal: Plan of Care Review  Outcome: Ongoing (interventions implemented as appropriate)   10/23/19 1551   Coping/Psychosocial   Plan of Care Reviewed With patient   Plan of Care Review   Progress improving   OTHER   Outcome Summary pt progressing towardsgoals as needed        Problem: Bariatric Surgery (Adult,Pediatric)  Intervention: Monitor and Manage Acute Postoperative Pain   10/23/19 1551   Promote Oxygenation/Perfusion   Pain Management Interventions breathing exercises;care clustered;pain management plan reviewed with patient/caregiver;pillow support provided;relaxation techniques promoted;prescribed exercises encouraged

## 2019-10-23 NOTE — BRIEF OP NOTE
GASTRIC SLEEVE LAPAROSCOPIC, ESOPHAGOGASTRODUODENOSCOPY  Progress Note    Danielle العلي Bin  10/23/2019    Pre-op Diagnosis:   Morbid obesity due to excess calories (CMS/HCC) [E66.01]       Post-Op Diagnosis Codes:     * Morbid obesity due to excess calories (CMS/HCC) [E66.01]     * Chronic cholecystitis without calculus [K81.1]    Procedure/CPT® Codes:  GA LAP, LUIS RESTRICT PROC, LONGITUDINAL GASTRECTOMY [01292]  GA LAP,CHOLECYSTECTOMY [40585]  GA ESOPHAGOGASTRODUODENOSCOPY TRANSORAL DIAGNOSTIC [50598]    Procedure(s):  GASTRIC SLEEVE LAPAROSCOPIC  CHOLECYSTECTOMY LAPAROSCOPIC  ESOPHAGOGASTRODUODENOSCOPY    Surgeon(s):  Dave Morrissey MD Weiss, George Derek, MD  Asst:  Kera Gasca MD PGY-4  Anesthesia: General with Block    Staff:   Circulator: Viri Torres RN  Scrub Person: Joyce Chilel  Nursing Assistant: Mirella Steiner    Estimated Blood Loss: minimal    Urine Voided: * No values recorded between 10/23/2019  7:27 AM and 10/23/2019  9:20 AM *    Specimens:                Specimens     ID Source Type Tests Collected By Collected At Frozen?      A Gallbladder Tissue · TISSUE PATHOLOGY EXAM   Dave Morrissey MD 10/23/19 0822      B Stomach Tissue · TISSUE PATHOLOGY EXAM   Dave Morrissey MD 10/23/19 0822 No     Description: SUB-TOTAL GASTRECTOMY                Drains:      Findings:     Complications: none      Dave Morrissey MD     Date: 10/23/2019  Time: 9:20 AM

## 2019-10-23 NOTE — OP NOTE
Preoperative Diagnosis:   Morbid Obesity with Multiple Co-morbidities                                                                          Chronic acalculous cholecystitis                                                                                                                                                     Postoperative Diagnosis:   Same    Procedure:                                                      Laparoscopic Sleeve Gastrectomy (85% subtotal vertical gastrectomy)                                                                        Laparoscopic cholecystectomy                                                                   EGD    Surgeon:                                                       SAHRA Morrissey MD    Asst:                                                            Kera Gasca MD PGY-4    Anesthesia:                                                   GETA    EBL:                                                              Minimal    Fluids:                                                           Crystalloid    Specimens:                                                   Subtotal gastrectomy, GB    Drains:                                                           None    Counts:                                                          Correct    Complications:                                               None    Indications:   This is a 48-year-old morbidly obese white female who presents for elective laparoscopic sleeve gastrectomy and  concomitant lap radha.  She is aware of the special circumstances of paying out of pocket for this procedure and BLIS selective complication protectionand still wishes to proceed. She's undergone our extensive preoperative education teaching and consent process everything's in order.    Operative technique:     The patient was brought to the operating room, and placed supine upon the operating room table.  SCD hose were placed, she  underwent uneventful general endotracheal anesthesia per the anesthesiology staff, she received IV Ancef and subcutaneous Lovenox, the anesthesiology staff performed a tap block, and her abdomen was prepped and draped with ChloraPrep in a sterile fashion, an Ioban was used as well, a Moctezuma catheter was not placed.    The peritoneal cavity was entered in high in the left abdomen in the left midclavicular line using a 5 mm trocar utilizing an Optiview technique and the abdomen was insufflated to a pressure of 15 mmHg with CO2 gas.  Exploratory laparoscopy revealed no evidence of injury from the entrance technique, an enlarged smooth fatty appearing liver, normal appearing GB, a mild rectus diastasis with a weakness at the umbilicus, no hernia.  Remaining trocars were placed under direct visualization including 5 mm trocars in the left subcostal position and right upper quadrant, a 12 mm trocar to the right of the umbilicus and a 15 mm trocar to the left of the umbilicus.  Through a stab incision in the epigastrium a Jose retractor was  used to elevate the left lobe of the liver.    No visible hiatal hernia visible from the anterior view and this was photo documented.  Beginning approximately two thirds of the way around the greater curvature the stomach, the gastrocolic vessels were divided with the Ligasure device.  This proceeded proximally taking down all the short gastric vessels and exposing the left jerome.  No visible hiatal hernia from this view and photo documented.  Gastrocolic vessels were then divided medially to a few cm proximal to the pylorus.  Some filmy attachments of the posterior stomach to the pancreas and retroperitoneum were divided.  An orogastric tube had been previously placed and was manipulated into the distal antrum.  Using a Kitner saturated with a marking pen, the stomach was marked 1 cm from the angle of His, 3 cm from the angularis, and 6 cm from the pylorus.  The 22 cm bariatric  standard clamp was placed into the peritoneal cavity and the stomach pulled through the clamp lining up the markings, and the clamp was closed and the orogastric tube removed.  The 85% subtotal vertical sleeve gastrectomy was then performed  using a Medtronic Signia electric linear articulating stapler with attached dual absorbable strips.  The first firing was a 60 mm black load, the next 3 firings were 60 mm purple loads.   The sleeve was performed such that it was uniform in size, no hourglassing or narrowing, especially at the angularis, and the final firing was done a centimeter away from the angle of His to hopefully avoid incorporating esophageal fibers.  The subtotal gastrectomy specimen was placed into a large retrieval bag and withdrawn and placed on a separate [place] stand, it was a larger than average size specimen.      Attention was directed toward cholecystectomy.  The Jose was removed and the left lateral 5 mm trocar moved up to this incision.  The gallbladder was visualized, appeared normal, no adhesions to surrounding structures.  The very large floppy medial left lobe of the liver was retracted.  The gallbladder was elevated over the liver and the neck of the gallbladder was retracted laterally.  Blunt dissection was used to circumferentially dissect the neck of the gallbladder and the cystic duct and artery. The cystic duct was of small caliber and had no palpable stones.  Dissection in the triangle of Calot revealed the csytic artery entering onto the surface of the gallbladder.  The cystic duct was divided individually between two 5 mm clips on the patient side, one on the gallbladder side.  The cystic artery was small and taken with the LigaSure device.  Peritoneal attachments of the gallbladder to the liver bed were divided, a small cholecystomy made in the process, some spillage of thin dark roberson brown bile, no stones or sludge.  The gallbladder was placed in to an Endopouch and  withdrawn through the 15 mm trocar site incision. I palpated the gallbladder through the bag, no masses or stones appreciated and it was sent o/w unopened to pathology for permanent section.  Krystina felicia maneuvers given and hemostasis of the gallbladder bed fossa was excellent.     The Jose was replaced and the 5 mm trocar returned to the left lateral position.    The sleeve was submerged under saline.  Upper endoscopy was performed, and the endoscope was advanced into the duodenal bulb.  No air bubbles or leak seen, no active bleeding at the staple line, no narrowing at the angularis, no pyloric spasm or deformity, no gastritis, no hiatal hernia or Napier's esophagus, and the endoscope was withdrawn.  The subtotal gastrectomy specimen was inspected, the stomach opened, no gross mucosal lesions and it was sent to pathology for permanent section.  Irrigation fluid was suctioned free.  The sleeve was resting nicely and hemostatic as was the GB bed fossa.   Photodocumentation of the sleeve was obtained.  The Jose retractor was removed.  Fascia at the 15 mm trocar site incision was closed with a horizontal mattress 0 Vicryl suture placed with a suture passer under direct visualization and tying the knot extracorporeally.  Fascia at the 12 mm trocar site incision was closed in a similar fashion with a simple interrupted 0 Vicryl suture.  Remaining trocars were removed under direct visualization, no bleeding noted from their sites.  Subcutaneous tissue in the 15 mm incision was closed with an interrupted 2-0 Vicryl plus suture, and skin in each incision was closed using 3-0 Monocryl plus in an interrupted subcuticular stitch followed by skin-a-fix.  The patient tolerated the procedure well without complication, was taken to the recovery room in stable condition.

## 2019-10-23 NOTE — ANESTHESIA PREPROCEDURE EVALUATION
Anesthesia Evaluation     history of anesthetic complications: PONV               Airway   Mallampati: I  TM distance: >3 FB  Neck ROM: full  No difficulty expected  Dental      Pulmonary    (+) shortness of breath,   Cardiovascular     ECG reviewed    (+) hypertension, CARTER, hyperlipidemia,     ROS comment: Normal echo normal stress    Neuro/Psych  GI/Hepatic/Renal/Endo    (+) morbid obesity, GERD,  renal disease,     Musculoskeletal     (+) back pain,   Abdominal    Substance History      OB/GYN          Other   (+) arthritis                     Anesthesia Plan    ASA 3     general   (Tap)  intravenous induction   Anesthetic plan, all risks, benefits, and alternatives have been provided, discussed and informed consent has been obtained with: patient.    Plan discussed with CRNA.

## 2019-10-23 NOTE — ANESTHESIA PROCEDURE NOTES
Peripheral Block      Patient reassessed immediately prior to procedure    Patient location during procedure: OR  Reason for block: at surgeon's request and post-op pain management  Performed by  Assisted by: Adolfo Amin MD  Preanesthetic Checklist  Completed: patient identified, site marked, surgical consent, pre-op evaluation, timeout performed, IV checked, risks and benefits discussed and monitors and equipment checked  Prep:  Pt Position: supine  Sterile barriers:cap, gloves, sterile barriers and mask  Prep: ChloraPrep  Patient monitoring: blood pressure monitoring, continuous pulse oximetry and EKG  Procedure  Sedation:yes  Performed under: general  Guidance:ultrasound guided  Images:still images obtained, printed/placed on chart    Laterality:Bilateral  Block Type:TAP  Injection Technique:single-shot  Needle Type:short-bevel and echogenic  Needle Gauge:20 G  Resistance on Injection: none    Medications Used: dexamethasone sodium phosphate injection, 4 mg  buprenorphine (BUPRENEX) injection, 0.3 mg  bupivacaine PF (MARCAINE) 0.25 % injection, 60 mL      Medications  Comment:Block Injection:  LA dose divided between Right and Left block        Post Assessment  Injection Assessment: negative aspiration for heme, incremental injection and no paresthesia on injection  Patient Tolerance:comfortable throughout block  Complications:no  Additional Notes      Under Ultrasound guidance, a BBraun 4inch 360 degree needle was advanced with Normal Saline hydro dissection of tissue.  The Internal Oblique and Transversus Abdominus muscles where visualized.  At or before the aponeurosis of Internal Oblique, local anesthetic spread was visualized in the Transversus Abdominus Plane. Injection was made incrementally with aspiration every 5 mls.  There was no  intravascular injection,  injection pressure was normal, there was no neural injection, and the procedure was completed without difficulty.  Thank You.

## 2019-10-23 NOTE — ANESTHESIA PROCEDURE NOTES
Airway  Urgency: elective    Date/Time: 10/23/2019 7:35 AM  Airway not difficult    General Information and Staff    Patient location during procedure: OR  CRNA: Jameel Schroeder CRNA    Indications and Patient Condition  Indications for airway management: airway protection    Preoxygenated: yes  MILS not maintained throughout  Mask difficulty assessment: 1 - vent by mask    Final Airway Details  Final airway type: endotracheal airway      Successful airway: ETT  Cuffed: yes   Successful intubation technique: direct laryngoscopy  Facilitating devices/methods: intubating stylet  Endotracheal tube insertion site: oral  Blade: Hernán  Blade size: 3  ETT size (mm): 7.5  Cormack-Lehane Classification: grade I - full view of glottis  Placement verified by: chest auscultation and capnometry   Measured from: lips  ETT/EBT  to lips (cm): 20  Number of attempts at approach: 1    Additional Comments  Negative epigastric sounds, Breath sound equal bilaterally with symmetric chest rise and fall

## 2019-10-24 ENCOUNTER — APPOINTMENT (OUTPATIENT)
Dept: GENERAL RADIOLOGY | Facility: HOSPITAL | Age: 49
End: 2019-10-24

## 2019-10-24 LAB
ALBUMIN SERPL-MCNC: 3.6 G/DL (ref 3.5–5.2)
ALBUMIN/GLOB SERPL: 1 G/DL
ALP SERPL-CCNC: 59 U/L (ref 39–117)
ALT SERPL W P-5'-P-CCNC: 115 U/L (ref 1–33)
ANION GAP SERPL CALCULATED.3IONS-SCNC: 16 MMOL/L (ref 5–15)
AST SERPL-CCNC: 95 U/L (ref 1–32)
BASOPHILS # BLD AUTO: 0.02 10*3/MM3 (ref 0–0.2)
BASOPHILS NFR BLD AUTO: 0.2 % (ref 0–1.5)
BILIRUB SERPL-MCNC: 0.7 MG/DL (ref 0.2–1.2)
BUN BLD-MCNC: 13 MG/DL (ref 6–20)
BUN/CREAT SERPL: 15.7 (ref 7–25)
CALCIUM SPEC-SCNC: 8.7 MG/DL (ref 8.6–10.5)
CHLORIDE SERPL-SCNC: 95 MMOL/L (ref 98–107)
CO2 SERPL-SCNC: 29 MMOL/L (ref 22–29)
CREAT BLD-MCNC: 0.83 MG/DL (ref 0.57–1)
DEPRECATED RDW RBC AUTO: 42.7 FL (ref 37–54)
EOSINOPHIL # BLD AUTO: 0.04 10*3/MM3 (ref 0–0.4)
EOSINOPHIL NFR BLD AUTO: 0.4 % (ref 0.3–6.2)
ERYTHROCYTE [DISTWIDTH] IN BLOOD BY AUTOMATED COUNT: 12.4 % (ref 12.3–15.4)
GFR SERPL CREATININE-BSD FRML MDRD: 73 ML/MIN/1.73
GLOBULIN UR ELPH-MCNC: 3.7 GM/DL
GLUCOSE BLD-MCNC: 113 MG/DL (ref 65–99)
GLUCOSE BLDC GLUCOMTR-MCNC: 114 MG/DL (ref 70–130)
GLUCOSE BLDC GLUCOMTR-MCNC: 115 MG/DL (ref 70–130)
GLUCOSE BLDC GLUCOMTR-MCNC: 117 MG/DL (ref 70–130)
GLUCOSE BLDC GLUCOMTR-MCNC: 135 MG/DL (ref 70–130)
HCT VFR BLD AUTO: 43.3 % (ref 34–46.6)
HGB BLD-MCNC: 14.1 G/DL (ref 12–15.9)
IMM GRANULOCYTES # BLD AUTO: 0.03 10*3/MM3 (ref 0–0.05)
IMM GRANULOCYTES NFR BLD AUTO: 0.3 % (ref 0–0.5)
IRON 24H UR-MRATE: 25 MCG/DL (ref 37–145)
LYMPHOCYTES # BLD AUTO: 0.3 10*3/MM3 (ref 0.7–3.1)
LYMPHOCYTES NFR BLD AUTO: 2.8 % (ref 19.6–45.3)
MCH RBC QN AUTO: 30.7 PG (ref 26.6–33)
MCHC RBC AUTO-ENTMCNC: 32.6 G/DL (ref 31.5–35.7)
MCV RBC AUTO: 94.1 FL (ref 79–97)
MONOCYTES # BLD AUTO: 0.41 10*3/MM3 (ref 0.1–0.9)
MONOCYTES NFR BLD AUTO: 3.8 % (ref 5–12)
NEUTROPHILS # BLD AUTO: 9.91 10*3/MM3 (ref 1.7–7)
NEUTROPHILS NFR BLD AUTO: 92.5 % (ref 42.7–76)
NRBC BLD AUTO-RTO: 0 /100 WBC (ref 0–0.2)
PLATELET # BLD AUTO: 224 10*3/MM3 (ref 140–450)
PMV BLD AUTO: 10.7 FL (ref 6–12)
POTASSIUM BLD-SCNC: 2.7 MMOL/L (ref 3.5–5.2)
PROT SERPL-MCNC: 7.3 G/DL (ref 6–8.5)
RBC # BLD AUTO: 4.6 10*6/MM3 (ref 3.77–5.28)
SODIUM BLD-SCNC: 140 MMOL/L (ref 136–145)
WBC NRBC COR # BLD: 10.71 10*3/MM3 (ref 3.4–10.8)

## 2019-10-24 PROCEDURE — 63710000001 PROCHLORPERAZINE MALEATE PER 10 MG: Performed by: SURGERY

## 2019-10-24 PROCEDURE — 94799 UNLISTED PULMONARY SVC/PX: CPT

## 2019-10-24 PROCEDURE — 25010000002 ONDANSETRON PER 1 MG: Performed by: SURGERY

## 2019-10-24 PROCEDURE — 80053 COMPREHEN METABOLIC PANEL: CPT | Performed by: SURGERY

## 2019-10-24 PROCEDURE — 25010000002 CYANOCOBALAMIN PER 1000 MCG: Performed by: SURGERY

## 2019-10-24 PROCEDURE — 74241: CPT

## 2019-10-24 PROCEDURE — 0 DIATRIZOATE MEGLUMINE & SODIUM PER 1 ML: Performed by: SURGERY

## 2019-10-24 PROCEDURE — 83540 ASSAY OF IRON: CPT | Performed by: SURGERY

## 2019-10-24 PROCEDURE — 99024 POSTOP FOLLOW-UP VISIT: CPT | Performed by: SURGERY

## 2019-10-24 PROCEDURE — 25010000002 NA FERRIC GLUC CPLX PER 12.5 MG: Performed by: SURGERY

## 2019-10-24 PROCEDURE — 82962 GLUCOSE BLOOD TEST: CPT

## 2019-10-24 PROCEDURE — 25010000002 THIAMINE PER 100 MG: Performed by: SURGERY

## 2019-10-24 PROCEDURE — 85025 COMPLETE CBC W/AUTO DIFF WBC: CPT | Performed by: SURGERY

## 2019-10-24 PROCEDURE — 25010000002 ENOXAPARIN PER 10 MG: Performed by: SURGERY

## 2019-10-24 RX ORDER — POTASSIUM CHLORIDE 750 MG/1
40 CAPSULE, EXTENDED RELEASE ORAL AS NEEDED
Status: DISCONTINUED | OUTPATIENT
Start: 2019-10-24 | End: 2019-10-25 | Stop reason: HOSPADM

## 2019-10-24 RX ORDER — POTASSIUM CHLORIDE 7.45 MG/ML
10 INJECTION INTRAVENOUS
Status: DISCONTINUED | OUTPATIENT
Start: 2019-10-24 | End: 2019-10-25 | Stop reason: HOSPADM

## 2019-10-24 RX ORDER — POTASSIUM CHLORIDE 1.5 G/1.77G
40 POWDER, FOR SOLUTION ORAL AS NEEDED
Status: DISCONTINUED | OUTPATIENT
Start: 2019-10-24 | End: 2019-10-25 | Stop reason: HOSPADM

## 2019-10-24 RX ADMIN — POTASSIUM CHLORIDE AND SODIUM CHLORIDE 125 ML/HR: 450; 150 INJECTION, SOLUTION INTRAVENOUS at 08:01

## 2019-10-24 RX ADMIN — THIAMINE HYDROCHLORIDE 250 ML/HR: 100 INJECTION, SOLUTION INTRAMUSCULAR; INTRAVENOUS at 07:12

## 2019-10-24 RX ADMIN — POTASSIUM CHLORIDE 40 MEQ: 750 CAPSULE, EXTENDED RELEASE ORAL at 10:28

## 2019-10-24 RX ADMIN — GABAPENTIN 100 MG: 100 CAPSULE ORAL at 20:33

## 2019-10-24 RX ADMIN — ONDANSETRON 4 MG: 2 INJECTION INTRAMUSCULAR; INTRAVENOUS at 00:11

## 2019-10-24 RX ADMIN — POTASSIUM CHLORIDE 40 MEQ: 750 CAPSULE, EXTENDED RELEASE ORAL at 13:14

## 2019-10-24 RX ADMIN — SODIUM CHLORIDE 125 MG: 9 INJECTION, SOLUTION INTRAVENOUS at 13:14

## 2019-10-24 RX ADMIN — PROCHLORPERAZINE MALEATE 10 MG: 10 TABLET ORAL at 04:59

## 2019-10-24 RX ADMIN — ATENOLOL 50 MG: 50 TABLET ORAL at 08:01

## 2019-10-24 RX ADMIN — POTASSIUM CHLORIDE 40 MEQ: 750 CAPSULE, EXTENDED RELEASE ORAL at 21:20

## 2019-10-24 RX ADMIN — ACETAMINOPHEN 1000 MG: 500 TABLET, FILM COATED ORAL at 05:00

## 2019-10-24 RX ADMIN — POTASSIUM CHLORIDE AND SODIUM CHLORIDE 125 ML/HR: 450; 150 INJECTION, SOLUTION INTRAVENOUS at 20:36

## 2019-10-24 RX ADMIN — GABAPENTIN 100 MG: 100 CAPSULE ORAL at 16:05

## 2019-10-24 RX ADMIN — Medication 30 ML: at 09:08

## 2019-10-24 RX ADMIN — ENOXAPARIN SODIUM 40 MG: 40 INJECTION SUBCUTANEOUS at 08:00

## 2019-10-24 RX ADMIN — GABAPENTIN 100 MG: 100 CAPSULE ORAL at 08:00

## 2019-10-24 RX ADMIN — HYDROCODONE BITARTRATE AND ACETAMINOPHEN 1 TABLET: 7.5; 325 TABLET ORAL at 02:32

## 2019-10-24 RX ADMIN — ALPRAZOLAM 0.25 MG: 0.25 TABLET ORAL at 08:21

## 2019-10-24 RX ADMIN — PANTOPRAZOLE SODIUM 40 MG: 40 INJECTION, POWDER, FOR SOLUTION INTRAVENOUS at 05:00

## 2019-10-24 RX ADMIN — CYANOCOBALAMIN 1000 MCG: 1000 INJECTION, SOLUTION INTRAMUSCULAR; SUBCUTANEOUS at 08:00

## 2019-10-24 NOTE — PROGRESS NOTES
"Bariatric Surgery Progress Note:      Chief Complaint:  POD #1    Subjective     Interval History:  Doing well.  No complaints.  Pain controlled.  Denies N/V.  No fevers.  Ambulating.  Voiding.  IS 1500 reported.  Has slept a lot today.  Has not quite done 4 oz/hr of liquids.    Objective     Vital Signs  Blood pressure 135/74, pulse 76, temperature 98.5 °F (36.9 °C), temperature source Oral, resp. rate 16, height 171.4 cm (67.48\"), weight 110 kg (241 lb 16 oz), SpO2 97 %, not currently breastfeeding.      Intake/Output Summary (Last 24 hours) at 10/24/2019 1514  Last data filed at 10/23/2019 2100  Gross per 24 hour   Intake 1000 ml   Output 1350 ml   Net -350 ml       Physical Exam:  General: Alert, NAD  Lungs: Clear  Heart: RRR  Abdomen: soft, appropriate, incisions okay  Extremities: warm, (+) SCDs       Labs:  Lab Results (last 24 hours)     Procedure Component Value Units Date/Time    POC Glucose Once [[id number]]  (Normal) Collected:  10/24/19 1117    Specimen:  Blood Updated:  10/24/19 1122     Glucose 117 mg/dL     Iron [[id number]]  (Abnormal) Collected:  10/24/19 0634    Specimen:  Blood Updated:  10/24/19 0856     Iron 25 mcg/dL     CBC & Differential [[id number]] Collected:  10/24/19 0634    Specimen:  Blood Updated:  10/24/19 0820    Narrative:       The following orders were created for panel order CBC & Differential.  Procedure                               Abnormality         Status                     ---------                               -----------         ------                     CBC Auto Differential[[id number]]        Abnormal            Final result                 Please view results for these tests on the individual orders.    CBC Auto Differential [[id number]]  (Abnormal) Collected:  10/24/19 0634    Specimen:  Blood Updated:  10/24/19 0820     WBC 10.71 10*3/mm3      RBC 4.60 10*6/mm3      Hemoglobin 14.1 g/dL      Hematocrit 43.3 %      MCV 94.1 fL      MCH 30.7 pg      MCHC 32.6 g/dL      " RDW 12.4 %      RDW-SD 42.7 fl      MPV 10.7 fL      Platelets 224 10*3/mm3      Neutrophil % 92.5 %      Lymphocyte % 2.8 %      Monocyte % 3.8 %      Eosinophil % 0.4 %      Basophil % 0.2 %      Immature Grans % 0.3 %      Neutrophils, Absolute 9.91 10*3/mm3      Lymphocytes, Absolute 0.30 10*3/mm3      Monocytes, Absolute 0.41 10*3/mm3      Eosinophils, Absolute 0.04 10*3/mm3      Basophils, Absolute 0.02 10*3/mm3      Immature Grans, Absolute 0.03 10*3/mm3      nRBC 0.0 /100 WBC     Comprehensive Metabolic Panel [[id number]]  (Abnormal) Collected:  10/24/19 0634    Specimen:  Blood Updated:  10/24/19 0814     Glucose 113 mg/dL      BUN 13 mg/dL      Creatinine 0.83 mg/dL      Sodium 140 mmol/L      Potassium 2.7 mmol/L      Chloride 95 mmol/L      CO2 29.0 mmol/L      Calcium 8.7 mg/dL      Total Protein 7.3 g/dL      Albumin 3.60 g/dL      ALT (SGPT) 115 U/L      AST (SGOT) 95 U/L      Alkaline Phosphatase 59 U/L      Total Bilirubin 0.7 mg/dL      eGFR Non African Amer 73 mL/min/1.73      Globulin 3.7 gm/dL      A/G Ratio 1.0 g/dL      BUN/Creatinine Ratio 15.7     Anion Gap 16.0 mmol/L     Narrative:       GFR Normal >60  Chronic Kidney Disease <60  Kidney Failure <15    POC Glucose Once [[id number]]  (Abnormal) Collected:  10/24/19 0725    Specimen:  Blood Updated:  10/24/19 0727     Glucose 135 mg/dL     POC Glucose Once [[id number]]  (Abnormal) Collected:  10/23/19 1931    Specimen:  Blood Updated:  10/23/19 1933     Glucose 154 mg/dL     POC Glucose Once [[id number]]  (Abnormal) Collected:  10/23/19 1617    Specimen:  Blood Updated:  10/23/19 1621     Glucose 144 mg/dL             Assessment/Plan     POD # 1 s/p LSG/radha.    Doing well.  Very low potassium (2.7).  Still receiving replacement ordered this morning with K+ recheck around [id number].  Has slept a lot today--not quite up to speed on oral intake.  Lives 2 hours away.  Recommended staying one more day to make sure she can maintain her potassium  level so as not to have side effects from hypokalemia.  Anticipate d/c tomorrow morning.  Continue OOB/ PT/ DVT prophx.      10/24/19  3:14 PM  Sherin Danielson MD

## 2019-10-24 NOTE — PLAN OF CARE
Problem: Patient Care Overview  Goal: Plan of Care Review  Outcome: Ongoing (interventions implemented as appropriate)   10/24/19 0321   Coping/Psychosocial   Plan of Care Reviewed With patient;mother   Plan of Care Review   Progress improving       Problem: Fall Risk (Adult)  Goal: Identify Related Risk Factors and Signs and Symptoms  Outcome: Ongoing (interventions implemented as appropriate)   10/24/19 6568   Fall Risk (Adult)   Related Risk Factors (Fall Risk) fear of falling;sleep pattern alteration   Signs and Symptoms (Fall Risk) presence of risk factors       Problem: Bariatric Surgery (Adult,Pediatric)  Goal: Signs and Symptoms of Listed Potential Problems Will be Absent, Minimized or Managed (Bariatric Surgery)  Outcome: Ongoing (interventions implemented as appropriate)   10/24/19 6911   Goal/Outcome Evaluation   Problems Assessed (Bariatric Surgery) all   Problems Present (Bariatric Surgery) bowel motility decreased;pain;postoperative nausea and vomiting

## 2019-10-24 NOTE — PROGRESS NOTES
Discharge Planning Assessment  [facility]     Patient Name: Danielle Steward  MRN: [id number]  Today's Date: 10/24/2019    Admit Date: 10/23/2019    Discharge Needs Assessment     Row Name 10/24/19 1054       Living Environment    Lives With  child(penny), adult    Name(s) of Who Lives With Patient  Sigifredo Silveira    Current Living Arrangements  home/apartment/condo    Primary Care Provided by  self    Provides Primary Care For  no one    Family Caregiver if Needed  child(penny), adult    Family Caregiver Names  Sigifredo Silevira    Quality of Family Relationships  supportive;involved;helpful    Able to Return to Prior Arrangements  yes    Living Arrangement Comments  Lives with daughter in house with 3 steps at entrance       Resource/Environmental Concerns    Resource/Environmental Concerns  none    Transportation Concerns  car, none       Transition Planning    Patient/Family Anticipates Transition to  home with family    Patient/Family Anticipated Services at Transition  none    Transportation Anticipated  family or friend will provide       Discharge Needs Assessment    Readmission Within the Last 30 Days  no previous admission in last 30 days    Concerns to be Addressed  no discharge needs identified    Equipment Currently Used at Home  none    Anticipated Changes Related to Illness  none    Equipment Needed After Discharge  none        Discharge Plan     Row Name 10/24/19 1056       Plan    Plan  Plan is home with daughter at discharge    Patient/Family in Agreement with Plan  yes    Plan Comments  Met with patient in the room for initial discharge planning.  Lives with maylinugher in house with 3 steps at entrance.  Denies issues with steps.  Independent.  No DME or in home services.  PCP is Bobby Toney.  Plan is home.  Following    Final Discharge Disposition Code  01 - home or self-care        Destination      No service coordination in this encounter.      Durable Medical Equipment       No service coordination in this encounter.      Dialysis/Infusion      No service coordination in this encounter.      Home Medical Care      No service coordination in this encounter.      Therapy      No service coordination in this encounter.      Community Resources      No service coordination in this encounter.          Demographic Summary     Row Name 10/24/19 1052       General Information    Admission Type  inpatient    Arrived From  operating room    Referral Source  admission list    Reason for Consult  discharge planning    Preferred Language  English        Functional Status     Row Name 10/24/19 1059       Functional Status    Usual Activity Tolerance  good    Current Activity Tolerance  good       Functional Status, IADL    Medications  independent    Meal Preparation  independent    Housekeeping  independent    Laundry  independent    Shopping  independent        Psychosocial    No documentation.       Abuse/Neglect    No documentation.       Legal    No documentation.       Substance Abuse    No documentation.       Patient Forms    No documentation.           Noemí Wellington RN

## 2019-10-24 NOTE — PLAN OF CARE
Problem: Patient Care Overview  Goal: Plan of Care Review  Outcome: Ongoing (interventions implemented as appropriate)   10/24/19 0239   Coping/Psychosocial   Plan of Care Reviewed With patient   Plan of Care Review   Progress no change       Problem: Bariatric Surgery (Adult,Pediatric)  Goal: Signs and Symptoms of Listed Potential Problems Will be Absent, Minimized or Managed (Bariatric Surgery)  Outcome: Ongoing (interventions implemented as appropriate)   10/24/19 8810   Goal/Outcome Evaluation   Problems Assessed (Bariatric Surgery) all   Problems Present (Bariatric Surgery) bowel motility decreased;pain;situational response

## 2019-10-25 VITALS
OXYGEN SATURATION: 94 % | SYSTOLIC BLOOD PRESSURE: 135 MMHG | DIASTOLIC BLOOD PRESSURE: 85 MMHG | RESPIRATION RATE: 18 BRPM | BODY MASS INDEX: 37.98 KG/M2 | HEART RATE: 65 BPM | HEIGHT: 67 IN | WEIGHT: 242 LBS | TEMPERATURE: 98.5 F

## 2019-10-25 LAB
ALBUMIN SERPL-MCNC: 3.4 G/DL (ref 3.5–5.2)
ALBUMIN/GLOB SERPL: 0.9 G/DL
ALP SERPL-CCNC: 138 U/L (ref 39–117)
ALT SERPL W P-5'-P-CCNC: 147 U/L (ref 1–33)
ANION GAP SERPL CALCULATED.3IONS-SCNC: 17 MMOL/L (ref 5–15)
AST SERPL-CCNC: 130 U/L (ref 1–32)
BASOPHILS # BLD AUTO: 0.03 10*3/MM3 (ref 0–0.2)
BASOPHILS NFR BLD AUTO: 0.4 % (ref 0–1.5)
BILIRUB SERPL-MCNC: 0.7 MG/DL (ref 0.2–1.2)
BUN BLD-MCNC: 13 MG/DL (ref 6–20)
BUN/CREAT SERPL: 19.4 (ref 7–25)
CALCIUM SPEC-SCNC: 8.5 MG/DL (ref 8.6–10.5)
CHLORIDE SERPL-SCNC: 97 MMOL/L (ref 98–107)
CO2 SERPL-SCNC: 22 MMOL/L (ref 22–29)
CREAT BLD-MCNC: 0.67 MG/DL (ref 0.57–1)
CYTO UR: NORMAL
DEPRECATED RDW RBC AUTO: 45.2 FL (ref 37–54)
EOSINOPHIL # BLD AUTO: 0.05 10*3/MM3 (ref 0–0.4)
EOSINOPHIL NFR BLD AUTO: 0.6 % (ref 0.3–6.2)
ERYTHROCYTE [DISTWIDTH] IN BLOOD BY AUTOMATED COUNT: 12.5 % (ref 12.3–15.4)
GFR SERPL CREATININE-BSD FRML MDRD: 94 ML/MIN/1.73
GLOBULIN UR ELPH-MCNC: 4 GM/DL
GLUCOSE BLD-MCNC: 104 MG/DL (ref 65–99)
GLUCOSE BLDC GLUCOMTR-MCNC: 117 MG/DL (ref 70–130)
HCT VFR BLD AUTO: 43 % (ref 34–46.6)
HGB BLD-MCNC: 13.7 G/DL (ref 12–15.9)
IMM GRANULOCYTES # BLD AUTO: 0.04 10*3/MM3 (ref 0–0.05)
IMM GRANULOCYTES NFR BLD AUTO: 0.5 % (ref 0–0.5)
LAB AP CASE REPORT: NORMAL
LAB AP CLINICAL INFORMATION: NORMAL
LYMPHOCYTES # BLD AUTO: 0.46 10*3/MM3 (ref 0.7–3.1)
LYMPHOCYTES NFR BLD AUTO: 5.9 % (ref 19.6–45.3)
MCH RBC QN AUTO: 31.1 PG (ref 26.6–33)
MCHC RBC AUTO-ENTMCNC: 31.9 G/DL (ref 31.5–35.7)
MCV RBC AUTO: 97.5 FL (ref 79–97)
MONOCYTES # BLD AUTO: 0.48 10*3/MM3 (ref 0.1–0.9)
MONOCYTES NFR BLD AUTO: 6.1 % (ref 5–12)
NEUTROPHILS # BLD AUTO: 6.79 10*3/MM3 (ref 1.7–7)
NEUTROPHILS NFR BLD AUTO: 86.5 % (ref 42.7–76)
NRBC BLD AUTO-RTO: 0 /100 WBC (ref 0–0.2)
PATH REPORT.FINAL DX SPEC: NORMAL
PATH REPORT.GROSS SPEC: NORMAL
PLATELET # BLD AUTO: 198 10*3/MM3 (ref 140–450)
PMV BLD AUTO: 10.7 FL (ref 6–12)
POTASSIUM BLD-SCNC: 3.6 MMOL/L (ref 3.5–5.2)
POTASSIUM BLD-SCNC: 3.9 MMOL/L (ref 3.5–5.2)
PROT SERPL-MCNC: 7.4 G/DL (ref 6–8.5)
RBC # BLD AUTO: 4.41 10*6/MM3 (ref 3.77–5.28)
SODIUM BLD-SCNC: 136 MMOL/L (ref 136–145)
WBC NRBC COR # BLD: 7.85 10*3/MM3 (ref 3.4–10.8)

## 2019-10-25 PROCEDURE — 85025 COMPLETE CBC W/AUTO DIFF WBC: CPT | Performed by: SURGERY

## 2019-10-25 PROCEDURE — 94799 UNLISTED PULMONARY SVC/PX: CPT

## 2019-10-25 PROCEDURE — 82962 GLUCOSE BLOOD TEST: CPT

## 2019-10-25 PROCEDURE — 84132 ASSAY OF SERUM POTASSIUM: CPT | Performed by: SURGERY

## 2019-10-25 PROCEDURE — 99024 POSTOP FOLLOW-UP VISIT: CPT | Performed by: SURGERY

## 2019-10-25 PROCEDURE — 80053 COMPREHEN METABOLIC PANEL: CPT | Performed by: SURGERY

## 2019-10-25 PROCEDURE — 25010000002 ENOXAPARIN PER 10 MG: Performed by: SURGERY

## 2019-10-25 RX ORDER — ONDANSETRON 4 MG/1
4 TABLET, ORALLY DISINTEGRATING ORAL EVERY 8 HOURS PRN
Qty: 10 TABLET | Refills: 0 | Status: SHIPPED | OUTPATIENT
Start: 2019-10-25 | End: 2020-02-07

## 2019-10-25 RX ORDER — POTASSIUM CHLORIDE 20 MEQ/1
40 TABLET, EXTENDED RELEASE ORAL 2 TIMES DAILY
Qty: 120 TABLET | Refills: 11 | Status: SHIPPED | OUTPATIENT
Start: 2019-10-25 | End: 2020-02-07

## 2019-10-25 RX ORDER — HYDROCODONE BITARTRATE AND ACETAMINOPHEN 7.5; 325 MG/1; MG/1
1 TABLET ORAL EVERY 6 HOURS PRN
Qty: 10 TABLET | Refills: 0 | Status: SHIPPED | OUTPATIENT
Start: 2019-10-25 | End: 2019-11-01

## 2019-10-25 RX ADMIN — ENOXAPARIN SODIUM 40 MG: 40 INJECTION SUBCUTANEOUS at 10:56

## 2019-10-25 RX ADMIN — HYDROMORPHONE HYDROCHLORIDE 2 MG: 2 TABLET ORAL at 10:56

## 2019-10-25 RX ADMIN — ACETAMINOPHEN 1000 MG: 500 TABLET, FILM COATED ORAL at 05:29

## 2019-10-25 RX ADMIN — ONDANSETRON HYDROCHLORIDE 4 MG: 4 TABLET, FILM COATED ORAL at 03:10

## 2019-10-25 RX ADMIN — POTASSIUM CHLORIDE AND SODIUM CHLORIDE 125 ML/HR: 450; 150 INJECTION, SOLUTION INTRAVENOUS at 03:11

## 2019-10-25 RX ADMIN — PANTOPRAZOLE SODIUM 40 MG: 40 INJECTION, POWDER, FOR SOLUTION INTRAVENOUS at 05:29

## 2019-10-25 RX ADMIN — ONDANSETRON HYDROCHLORIDE 4 MG: 4 TABLET, FILM COATED ORAL at 10:56

## 2019-10-25 RX ADMIN — ATENOLOL 50 MG: 50 TABLET ORAL at 10:56

## 2019-10-25 NOTE — PROGRESS NOTES
"Bariatric Surgery     LOS: 2 days   Patient Care Team:  Bobby Agosto MD as PCP - General (Family Medicine)  Sherin Danielson MD as Surgeon (General Surgery)  Bear Camacho MD as Consulting Physician (Cardiology)    Chief Complaint:  POD #2    Subjective     Interval History:  Doing well.  No complaints.  Tolerating PO. Denies N/V.  No fevers.  Pain controlled.  Ambulating.  Voiding. Potassium recheck last night was 3.9 from 2.7, and 3.6 this morning.      Objective     Vital Signs  Blood pressure 135/85, pulse 65, temperature 98.5 °F (36.9 °C), temperature source Oral, resp. rate 18, height 171.4 cm (67.48\"), weight 110 kg (241 lb 16 oz), SpO2 94 %, not currently breastfeeding.    Physical Exam:  General: Alert, NAD  Lungs: Clear  Heart: RRR  Abdomen: soft, appropriate, incisions okay  Extremities: warm, (+) SCDs     Results Review:     I reviewed the patient's new clinical results.    Labs:  Lab Results (last 24 hours)     Procedure Component Value Units Date/Time    Comprehensive Metabolic Panel [[id number]]  (Abnormal) Collected:  10/25/19 0638    Specimen:  Blood Updated:  10/25/19 0835     Glucose 104 mg/dL      BUN 13 mg/dL      Creatinine 0.67 mg/dL      Sodium 136 mmol/L      Potassium 3.6 mmol/L      Chloride 97 mmol/L      CO2 22.0 mmol/L      Calcium 8.5 mg/dL      Total Protein 7.4 g/dL      Albumin 3.40 g/dL      ALT (SGPT) 147 U/L      AST (SGOT) 130 U/L      Alkaline Phosphatase 138 U/L      Total Bilirubin 0.7 mg/dL      eGFR Non African Amer 94 mL/min/1.73      Globulin 4.0 gm/dL      A/G Ratio 0.9 g/dL      BUN/Creatinine Ratio 19.4     Anion Gap 17.0 mmol/L     Narrative:       GFR Normal >60  Chronic Kidney Disease <60  Kidney Failure <15    CBC & Differential [[id number]] Collected:  10/25/19 0638    Specimen:  Blood Updated:  10/25/19 0807    Narrative:       The following orders were created for panel order CBC & Differential.  Procedure                               " Abnormality         Status                     ---------                               -----------         ------                     CBC Auto Differential[[id number]]        Abnormal            Final result                 Please view results for these tests on the individual orders.    CBC Auto Differential [[id number]]  (Abnormal) Collected:  10/25/19 0638    Specimen:  Blood Updated:  10/25/19 0807     WBC 7.85 10*3/mm3      RBC 4.41 10*6/mm3      Hemoglobin 13.7 g/dL      Hematocrit 43.0 %      MCV 97.5 fL      MCH 31.1 pg      MCHC 31.9 g/dL      RDW 12.5 %      RDW-SD 45.2 fl      MPV 10.7 fL      Platelets 198 10*3/mm3      Neutrophil % 86.5 %      Lymphocyte % 5.9 %      Monocyte % 6.1 %      Eosinophil % 0.6 %      Basophil % 0.4 %      Immature Grans % 0.5 %      Neutrophils, Absolute 6.79 10*3/mm3      Lymphocytes, Absolute 0.46 10*3/mm3      Monocytes, Absolute 0.48 10*3/mm3      Eosinophils, Absolute 0.05 10*3/mm3      Basophils, Absolute 0.03 10*3/mm3      Immature Grans, Absolute 0.04 10*3/mm3      nRBC 0.0 /100 WBC     POC Glucose Once [[id number]]  (Normal) Collected:  10/25/19 0719    Specimen:  Blood Updated:  10/25/19 0721     Glucose 117 mg/dL     Potassium [[id number]]  (Normal) Collected:  10/25/19 0045    Specimen:  Blood Updated:  10/25/19 0125     Potassium 3.9 mmol/L     POC Glucose Once [[id number]]  (Normal) Collected:  10/24/19 2013    Specimen:  Blood Updated:  10/24/19 2018     Glucose 114 mg/dL     POC Glucose Once [[id number]]  (Normal) Collected:  10/24/19 1622    Specimen:  Blood Updated:  10/24/19 1625     Glucose 115 mg/dL     POC Glucose Once [[id number]]  (Normal) Collected:  10/24/19 1117    Specimen:  Blood Updated:  10/24/19 1122     Glucose 117 mg/dL           Imaging:  Imaging Results (last 24 hours)     Procedure Component Value Units Date/Time    FL Upper GI Water Soluble [[id number]] Collected:  10/24/19 0932     Updated:  10/24/19 1719    Narrative:       EXAMINATION: FL  UPPER GI, WATER SOLUBLE-10/24/2019:     INDICATION: Sleeve water soluble; E66.01-Morbid (severe) obesity due to  excess calories, gastric sleeve.     TECHNIQUE: 39 seconds of fluoroscopy and 6 images used for water-soluble  upper GI.     COMPARISON: NONE.     FINDINGS:  imaging was obtained and was unremarkable. Postsurgical  changes identified within the left upper quadrant. The distal esophagus  is unremarkable with only mild tertiary contractions identified. There  is expected postoperative changes seen from gastric spleen. No  extravasation of contrast identified. No evidence of gastric outlet  obstruction.       Impression:       Postoperative changes seen from gastric sleeve with no  extravasation of contrast.     D:  10/24/2019  E:  10/24/2019            This report was finalized on 10/24/2019 5:16 PM by Dr. Padmini Adams MD.               Assessment/Plan     POD # 2 s/p LSG/radha.    Doing well.  Hypokalemia: received a large replacement yesterday with good result.  Patient feels well and has met discharge criteria.  Will discharge home with follow up in 1 week with PA.  Rx given for KCL 40 meq bid, Lortab, Zofran.  Instructed to f/u with PCP this week for potasium recheck and further management.   Discharge instructions reviewed with patient and all questions answered.      Sherin Danielson MD  10/25/19  10:01 AM

## 2019-10-25 NOTE — PLAN OF CARE
Problem: Patient Care Overview  Goal: Plan of Care Review  Outcome: Ongoing (interventions implemented as appropriate)   10/25/19 0224   Coping/Psychosocial   Plan of Care Reviewed With patient   Plan of Care Review   Progress improving     Goal: Individualization and Mutuality  Outcome: Ongoing (interventions implemented as appropriate)      Problem: Bariatric Surgery (Adult,Pediatric)  Goal: Signs and Symptoms of Listed Potential Problems Will be Absent, Minimized or Managed (Bariatric Surgery)  Outcome: Ongoing (interventions implemented as appropriate)   10/25/19 0224   Goal/Outcome Evaluation   Problems Assessed (Bariatric Surgery) all   Problems Present (Bariatric Surgery) bowel motility decreased;pain

## 2019-10-25 NOTE — PROGRESS NOTES
Continued Stay Note  [facility]     Patient Name: Danielle Steward  MRN: [id number]  Today's Date: 10/25/2019    Admit Date: 10/23/2019    Discharge Plan     Row Name 10/25/19 1033       Plan    Plan  Plan is home    Patient/Family in Agreement with Plan  yes    Plan Comments  Patient up and about in the room.  Reports feeling better.  Plan is home.  Following for D/C needs.     Final Discharge Disposition Code  01 - home or self-care        Discharge Codes    No documentation.       Expected Discharge Date and Time     Expected Discharge Date Expected Discharge Time    Oct 25, 2019             Noemí Wellington RN

## 2019-10-25 NOTE — PLAN OF CARE
Problem: Patient Care Overview  Goal: Plan of Care Review  Outcome: Ongoing (interventions implemented as appropriate)   10/25/19 1101   Coping/Psychosocial   Plan of Care Reviewed With patient;mother   Plan of Care Review   Progress improving       Problem: Bariatric Surgery (Adult,Pediatric)  Goal: Signs and Symptoms of Listed Potential Problems Will be Absent, Minimized or Managed (Bariatric Surgery)  Outcome: Ongoing (interventions implemented as appropriate)   10/25/19 1101   Goal/Outcome Evaluation   Problems Assessed (Bariatric Surgery) all   Problems Present (Bariatric Surgery) postoperative nausea and vomiting

## 2019-10-28 ENCOUNTER — TELEPHONE (OUTPATIENT)
Dept: BARIATRICS/WEIGHT MGMT | Facility: CLINIC | Age: 49
End: 2019-10-28

## 2019-10-28 DIAGNOSIS — E87.6 HYPOKALEMIA: Primary | ICD-10-CM

## 2019-10-28 NOTE — TELEPHONE ENCOUNTER
Pt called in requesting a lab order for potassium, stating that it was still low when she was discharged from the hospital. Pt has an appointment on Friday 11/1. Do you want to place the order, or do you want her to wait until Friday? Please advise, thank you.

## 2019-10-28 NOTE — TELEPHONE ENCOUNTER
Contacted pt, asked her if she is taking her potassium Rx  KCL 40 meq bid. Pt stated that she was taking this. I let her know that we placed an external lab order, and that she will need PCP to further manage potassium issues, but we can recheck her labs now.  Pt verbalized understanding.

## 2019-10-29 NOTE — ANESTHESIA POSTPROCEDURE EVALUATION
Patient: Danielle Steward    Procedure Summary     Date:  10/23/19 Room / Location:   [facility] /  [facility]    Anesthesia Start:  0731 Anesthesia Stop:  0933    Procedures:       GASTRIC SLEEVE LAPAROSCOPIC (N/A Abdomen)      ESOPHAGOGASTRODUODENOSCOPY (N/A Esophagus)      CHOLECYSTECTOMY LAPAROSCOPIC (N/A Abdomen) Diagnosis:       Morbid obesity due to excess calories (CMS/HCC)      Chronic cholecystitis without calculus      (Morbid obesity due to excess calories (CMS/HCC) [E66.01])    Surgeon:  Dave Morrissey MD Provider:  Adolfo Amin MD    Anesthesia Type:  general ASA Status:  3          Anesthesia Type: general  Last vitals  BP   135/85 (10/25/19 0719)   Temp   98.5 °F (36.9 °C) (10/25/19 0719)   Pulse   65 (10/25/19 0719)   Resp   18 (10/25/19 0719)     SpO2   94 % (10/25/19 0719)     Post Anesthesia Care and Evaluation    Patient location during evaluation: PACU  Patient participation: complete - patient participated  Level of consciousness: awake and alert  Pain score: 0  Pain management: adequate  Airway patency: patent  Anesthetic complications: No anesthetic complications  PONV Status: none  Cardiovascular status: hemodynamically stable and acceptable  Respiratory status: nonlabored ventilation, acceptable and nasal cannula  Hydration status: acceptable

## 2019-11-01 ENCOUNTER — OFFICE VISIT (OUTPATIENT)
Dept: BARIATRICS/WEIGHT MGMT | Facility: CLINIC | Age: 49
End: 2019-11-01

## 2019-11-01 VITALS
HEIGHT: 68 IN | SYSTOLIC BLOOD PRESSURE: 124 MMHG | RESPIRATION RATE: 18 BRPM | TEMPERATURE: 97 F | DIASTOLIC BLOOD PRESSURE: 78 MMHG | BODY MASS INDEX: 33.95 KG/M2 | WEIGHT: 224 LBS | HEART RATE: 77 BPM | OXYGEN SATURATION: 99 %

## 2019-11-01 DIAGNOSIS — E66.9 OBESITY, CLASS I, BMI 30-34.9: ICD-10-CM

## 2019-11-01 DIAGNOSIS — E87.6 HYPOKALEMIA: Primary | ICD-10-CM

## 2019-11-01 DIAGNOSIS — Z98.84 STATUS POST BARIATRIC SURGERY: ICD-10-CM

## 2019-11-01 PROCEDURE — 99024 POSTOP FOLLOW-UP VISIT: CPT | Performed by: PHYSICIAN ASSISTANT

## 2019-11-01 NOTE — PROGRESS NOTES
[facility] Bariatric Surgery  [address]  [facility] [id number] [phone]      Patient Name:  Danielle Steward.  :  1970      Reason for Visit:  POD #9    HPI:  Danielle Steward is a 48 y.o. female s/p LSG/ radha by  on 10/23/19, self pay/ [facility]    POD#1 with hypokalemia, less than adequate PO intake. Discharged POD#2 hypokalemia resolved. Rx for KCle 40meq BID, lortab, zofran. F/u with PCP for potassium recheck and management.     Doing well.  No issues/concerns. Minimal wave of nausea initially, has not required antiemetics or pain meds. Has some epigastric pressure with drinking too fast. Did not see PCP re: K+, no prior issues, did not have labs redrawn yet from order sent.  Denies dysphagia, reflux, nausea, vomiting, pulmonary issues and fevers.  Tolerating diet progression - on stage 2.  Getting 75-100g prot/day, shakes, although getting tired of all the sweets, hoping to find savory alternative.   Drinking 64+ fluid oz/day.  Has not started vitamins yet. On weekly D.   On dexilant.  Holding ASA , NSAIDs , Tramadol, Hormones, Diuretics , Steroids and Immunologics.  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     Presurgery weight: 242 pounds.  Today's weight is 102 kg (224 lb) pounds, today's  Body mass index is 34.57 kg/m²., and her weight loss since surgery is 18 pounds.       Final Diagnosis    1. GALLBLADDER, CHOLECYSTECTOMY:  Chronic cholecystitis and cholelithiasis.   2. 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  Organisms morphologically compatible with H.pylori absent on routine stains.       Past Medical History:   Diagnosis Date   • Acalculous cholecystitis    • Back pain    • Blindness and low vision     right eye only, legally blind in right eye, has peripheral vision only   • Chronic kidney disease, stage II (mild)    • Delayed gastric emptying     suspected given large amount of  retained food on EGD.   • Dysphagia     occasional   • Gastroparesis    • GERD (gastroesophageal reflux disease)     severe, on Dexilant bid and Zantac daily, controls. EGD by Dr. Agosto in [place] showed GE junction 40 mg, findings of large amount of retained food.     • Glaucoma (increased eye pressure)     per pt not glaucoma yet, but hx of increased pressure in left eye, which is her only functional eye   • Hypertension    • Hypertriglyceridemia    •  (normal spontaneous vaginal delivery)     x 2, bedrest for 2 weeks with the second for preeclampsia   • Osteoarthritis    • PONV (postoperative nausea and vomiting)    • Prediabetes    • Sleep disturbance     snoring, has not been tested for SUMAYA   • Vitamin D deficiency      Past Surgical History:   Procedure Laterality Date   • CATARACT EXTRACTION Right    • CATARACT EXTRACTION WITH INTRAOCULAR LENS IMPLANT Right    • CHOLECYSTECTOMY N/A 10/23/2019    Procedure: CHOLECYSTECTOMY LAPAROSCOPIC;  Surgeon: Dave Morrissey MD;  Location:  [facility];  Service: General   • COLONOSCOPY     • ENDOSCOPY     • ENDOSCOPY N/A 10/23/2019    Procedure: ESOPHAGOGASTRODUODENOSCOPY;  Surgeon: Dave Morrissey MD;  Location:  [facility];  Service: Bariatric   • GASTRIC SLEEVE LAPAROSCOPIC N/A 10/23/2019    Procedure: GASTRIC SLEEVE LAPAROSCOPIC;  Surgeon: Dave Morrissey MD;  Location:  [facility];  Service: Bariatric   • HYSTEROSCOPY ENDOMETRIAL ABLATION     • LAPAROSCOPIC TUBAL LIGATION     • LASIK     • STRABISMUS SURGERY Right      Outpatient Medications Marked as Taking for the 19 encounter (Office Visit) with Moraima Washington PA-C   Medication Sig Dispense Refill   • atenolol (TENORMIN) 50 MG tablet Take 50 mg by mouth Daily.     • dexlansoprazole (DEXILANT) 30 MG capsule Take 30 mg by mouth 2 (Two) Times a Day.     • Multiple Vitamins-Minerals (PRESERVISION AREDS 2 PO) Take 1 tablet by mouth 2 (Two) Times a Day.     • ondansetron ODT (ZOFRAN ODT) 4 MG  "disintegrating tablet Place 1 tablet on the tongue Every 8 (Eight) Hours As Needed for Nausea or Vomiting. 10 tablet 0   • potassium chloride (K-DUR,KLOR-CON) 20 MEQ CR tablet Take 2 tablets by mouth 2 (Two) Times a Day. 120 tablet 11   • vitamin D (ERGOCALCIFEROL) 77306 units capsule capsule Take 50,000 Units by mouth 1 (One) Time Per Week.       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         /78 (BP Location: Left arm, Patient Position: Sitting, Cuff Size: Large Adult)   Pulse 77   Temp 97 °F (36.1 °C) (Temporal)   Resp 18   Ht 171.5 cm (67.5\")   Wt 102 kg (224 lb)   LMP  (LMP Unknown)   SpO2 99%   BMI 34.57 kg/m²   Physical Exam   Constitutional: She is oriented to person, place, and time. She appears well-developed and well-nourished.   HENT:   Head: Normocephalic and atraumatic.   Cardiovascular: Normal rate, regular rhythm and normal heart sounds.   Pulmonary/Chest: Effort normal and breath sounds normal. No respiratory distress. She has no wheezes.   Abdominal: Soft. Bowel sounds are normal. She exhibits no distension. There is no tenderness.   Incisions healing well "   Neurological: She is alert and oriented to person, place, and time.   Skin: Skin is warm and dry.   Psychiatric: She has a normal mood and affect. Her behavior is normal. Judgment and thought content normal.         Assessment:   POD #9 s/p LSG/ radha by  on 10/23/19    ICD-10-CM ICD-9-CM   1. Hypokalemia E87.6 276.8   2. Status post bariatric surgery Z98.84 V45.86   3. Obesity, Class I, BMI 30-34.9 E66.9 278.00       Plan:  Doing well. Will check labs today with hypokalemia, share with PCP for further management of supplementation. Continue to advance diet per manual.  Increase protein intake to 100g/day, options for protein powder to add to savory food items, samples given.  Increase exercise/activity as tolerated.  Reviewed lifting restrictions, nothing >25 lbs x 2 more weeks.  Start full vitamin regimen.  Continue PPI.  Continue to avoid ASA/NSAIDs/tramadol/tobacco x 6 weeks postop, steroids x 8 weeks postop.  Call w/ problems/concerns.    The patient was instructed to follow up in 3 weeks, sooner if needed.

## 2019-11-02 LAB
BUN SERPL-MCNC: 18 MG/DL (ref 6–24)
BUN/CREAT SERPL: 24 (ref 9–23)
CALCIUM SERPL-MCNC: 9 MG/DL (ref 8.7–10.2)
CHLORIDE SERPL-SCNC: 104 MMOL/L (ref 96–106)
CO2 SERPL-SCNC: 23 MMOL/L (ref 20–29)
CREAT SERPL-MCNC: 0.76 MG/DL (ref 0.57–1)
GLUCOSE SERPL-MCNC: 118 MG/DL (ref 65–99)
POTASSIUM SERPL-SCNC: 5.4 MMOL/L (ref 3.5–5.2)
SODIUM SERPL-SCNC: 142 MMOL/L (ref 134–144)

## 2019-11-06 ENCOUNTER — RESULTS ENCOUNTER (OUTPATIENT)
Dept: BARIATRICS/WEIGHT MGMT | Facility: CLINIC | Age: 49
End: 2019-11-06

## 2019-11-06 DIAGNOSIS — E87.6 HYPOKALEMIA: ICD-10-CM

## 2019-11-28 ENCOUNTER — RESULTS ENCOUNTER (OUTPATIENT)
Dept: BARIATRICS/WEIGHT MGMT | Facility: CLINIC | Age: 49
End: 2019-11-28

## 2019-11-28 DIAGNOSIS — E87.6 HYPOKALEMIA: ICD-10-CM

## 2019-12-02 ENCOUNTER — OFFICE VISIT (OUTPATIENT)
Dept: BARIATRICS/WEIGHT MGMT | Facility: CLINIC | Age: 49
End: 2019-12-02

## 2019-12-02 VITALS
HEART RATE: 74 BPM | BODY MASS INDEX: 33.49 KG/M2 | TEMPERATURE: 97.8 F | DIASTOLIC BLOOD PRESSURE: 68 MMHG | OXYGEN SATURATION: 99 % | HEIGHT: 68 IN | WEIGHT: 221 LBS | RESPIRATION RATE: 18 BRPM | SYSTOLIC BLOOD PRESSURE: 122 MMHG

## 2019-12-02 DIAGNOSIS — R53.83 FATIGUE, UNSPECIFIED TYPE: ICD-10-CM

## 2019-12-02 DIAGNOSIS — K91.2 POSTGASTRECTOMY MALABSORPTION: ICD-10-CM

## 2019-12-02 DIAGNOSIS — Z90.3 POSTGASTRECTOMY MALABSORPTION: ICD-10-CM

## 2019-12-02 DIAGNOSIS — Z98.84 STATUS POST BARIATRIC SURGERY: ICD-10-CM

## 2019-12-02 DIAGNOSIS — E55.9 HYPOVITAMINOSIS D: ICD-10-CM

## 2019-12-02 DIAGNOSIS — E66.9 OBESITY, CLASS I, BMI 30-34.9: Primary | ICD-10-CM

## 2019-12-02 DIAGNOSIS — Z13.0 SCREENING, IRON DEFICIENCY ANEMIA: ICD-10-CM

## 2019-12-02 DIAGNOSIS — Z13.21 MALNUTRITION SCREEN: ICD-10-CM

## 2019-12-02 PROCEDURE — 99024 POSTOP FOLLOW-UP VISIT: CPT | Performed by: PHYSICIAN ASSISTANT

## 2019-12-02 NOTE — PROGRESS NOTES
[facility] Bariatric Surgery  [address]  [facility] [address] [phone]      Patient Name:  Danielle Steward.  :  1970      Reason for Visit:  1 month postop    HPI:  Danielle Steward is a 48 y.o. female s/p LSG/ radha by  on 10/23/19, self pay/ [facility]    POD#1 with hypokalemia, less than adequate PO intake. Discharged POD#2 hypokalemia resolved. Rx for KCle 40meq BID, lortab, zofran. F/u with PCP for potassium recheck and management.     Doing well. Discouraged she hasn't seen much weightloss.  Although others are telling her they notice it and she has seen a change in clothing size/ fit.  Very rare nausea.    No other issues/concerns. Denies dysphagia, reflux, vomiting, abdominal pain, pulmonary issues and fevers.  Tolerating diet progression - on stage 6.  Getting 100+g prot/day. Shake in morning, protein water midmorning.  Lunch: chili, soup + protein powder. Dinner: grilled chicken, green beans. Tracking on an jennifer. Getting 800 calories.  Drinking 48-64+ fluid oz/day.  Taking MVI, B12, B1, Calcium, Vit D, iron and Vit C, misses iron occasionally- very constipating.  On dexilant.  Still holding ASA , NSAIDs , Tramadol, Hormones, Diuretics , Steroids and Immunologics.  Not exercising yet, using stand up desk and tracking steps. 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      Presurgery weight:  242 pounds. Today's weight is 100 kg (221 lb) pounds, today's Body mass index is 34.1 kg/m²., and her weight loss since surgery is 21 pounds.       Past Medical History:   Diagnosis Date   • Acalculous cholecystitis    • Back pain    • Blindness and low vision     right eye only, legally blind in right eye, has peripheral vision only   • Chronic kidney disease, stage II (mild)    • Delayed gastric emptying     suspected given large amount of retained food on EGD.   • Dysphagia     occasional   • Gastroparesis    • GERD  (gastroesophageal reflux disease)     severe, on Dexilant bid and Zantac daily, controls. EGD by Dr. Agosto in [place] showed GE junction 40 mg, findings of large amount of retained food.     • Glaucoma (increased eye pressure)     per pt not glaucoma yet, but hx of increased pressure in left eye, which is her only functional eye   • Hypertension    • Hypertriglyceridemia    •  (normal spontaneous vaginal delivery)     x 2, bedrest for 2 weeks with the second for preeclampsia   • Osteoarthritis    • PONV (postoperative nausea and vomiting)    • Prediabetes    • Sleep disturbance     snoring, has not been tested for SUMAYA   • Vitamin D deficiency      Past Surgical History:   Procedure Laterality Date   • CATARACT EXTRACTION Right    • CATARACT EXTRACTION WITH INTRAOCULAR LENS IMPLANT Right    • CHOLECYSTECTOMY N/A 10/23/2019    Procedure: CHOLECYSTECTOMY LAPAROSCOPIC;  Surgeon: Dave Morrissey MD;  Location:  [facility];  Service: General   • COLONOSCOPY     • ENDOSCOPY     • ENDOSCOPY N/A 10/23/2019    Procedure: ESOPHAGOGASTRODUODENOSCOPY;  Surgeon: Dave Morrissey MD;  Location:  [facility];  Service: Bariatric   • GASTRIC SLEEVE LAPAROSCOPIC N/A 10/23/2019    Procedure: GASTRIC SLEEVE LAPAROSCOPIC;  Surgeon: Dave Morrissey MD;  Location:  [facility];  Service: Bariatric   • HYSTEROSCOPY ENDOMETRIAL ABLATION     • LAPAROSCOPIC TUBAL LIGATION     • LASIK     • STRABISMUS SURGERY Right      Outpatient Medications Marked as Taking for the 19 encounter (Office Visit) with Moraima Washington PA-C   Medication Sig Dispense Refill   • atenolol (TENORMIN) 50 MG tablet Take 50 mg by mouth Daily.     • dexlansoprazole (DEXILANT) 30 MG capsule Take 30 mg by mouth 2 (Two) Times a Day.     • Multiple Vitamins-Minerals (PRESERVISION AREDS 2 PO) Take 1 tablet by mouth 2 (Two) Times a Day.     • [DISCONTINUED] vitamin D (ERGOCALCIFEROL) 76129 units capsule capsule Take 50,000 Units by mouth 1 (One) Time Per  "Week.       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         /68 (BP Location: Left arm, Patient Position: Sitting, Cuff Size: Large Adult)   Pulse 74   Temp 97.8 °F (36.6 °C) (Temporal)   Resp 18   Ht 171.5 cm (67.5\")   Wt 100 kg (221 lb)   SpO2 99%   BMI 34.10 kg/m²     Physical Exam   Constitutional: She is oriented to person, place, and time. She appears well-developed and well-nourished.   HENT:   Head: Normocephalic and atraumatic.   Cardiovascular: Normal rate, regular rhythm and normal heart sounds.   Pulmonary/Chest: Effort normal and breath sounds normal. No respiratory distress. She has no wheezes.   Abdominal: Soft. Bowel sounds are normal. She exhibits no distension. There is no guarding.   Incisions well healed   Neurological: She is alert and oriented to person, place, and time.   Skin: Skin is warm and dry.   Psychiatric: She has a normal mood and affect. Her behavior is normal. Judgment and thought content normal.         Assessment:  1 month  s/p LSG/ radha by  on 10/23/19, self pay/ [facility]    ICD-10-CM ICD-9-CM   1. Obesity, Class I, BMI 30-34.9 E66.9 278.00 "   2. Status post bariatric surgery Z98.84 V45.86   3. Hypovitaminosis D E55.9 268.9   4. Screening, iron deficiency anemia Z13.0 V78.0   5. Malnutrition screen Z13.21 V77.2   6. Postgastrectomy malabsorption K91.2 579.3    Z90.3    7. Fatigue, unspecified type R53.83 780.79         Plan:  Doing well.  Increase calories with more meals per day.  Tanita shows greater fat mass loss than total weight which is encouraging. Continue to advance diet per manual.  Continue protein 70-100g/day.  Encouraged good food choices - high protein, low carb.  Continue fluids 64+oz per day. Encouraged routine exercise, lifting restrictions lifted.  Continue PPI. Continue to avoid NSAIDS, ASA, tramadol, tobacco x 6 weeks postop, steroids x 8 weeks postop.  Routine bariatric labs ordered.  Continue vitamins w/ adjustments pending lab results.  Call w/ problems/concerns.    The patient was instructed to follow up in 2 months, sooner if needed.

## 2019-12-05 LAB
FERRITIN SERPL-MCNC: 130 NG/ML (ref 15–150)
FOLATE SERPL-MCNC: >20 NG/ML
IRON SERPL-MCNC: 72 UG/DL (ref 27–159)
PREALB SERPL-MCNC: 21 MG/DL (ref 12–34)
VIT B1 BLD-SCNC: 155.9 NMOL/L (ref 66.5–200)

## 2020-02-07 ENCOUNTER — OFFICE VISIT (OUTPATIENT)
Dept: BARIATRICS/WEIGHT MGMT | Facility: CLINIC | Age: 50
End: 2020-02-07

## 2020-02-07 VITALS
HEART RATE: 53 BPM | RESPIRATION RATE: 18 BRPM | WEIGHT: 218.5 LBS | SYSTOLIC BLOOD PRESSURE: 126 MMHG | HEIGHT: 68 IN | OXYGEN SATURATION: 99 % | DIASTOLIC BLOOD PRESSURE: 70 MMHG | TEMPERATURE: 97 F | BODY MASS INDEX: 33.12 KG/M2

## 2020-02-07 DIAGNOSIS — E87.6 HYPOKALEMIA: ICD-10-CM

## 2020-02-07 DIAGNOSIS — R79.89 ELEVATED LFTS: ICD-10-CM

## 2020-02-07 DIAGNOSIS — E66.9 OBESITY, CLASS I, BMI 30-34.9: ICD-10-CM

## 2020-02-07 DIAGNOSIS — E55.9 VITAMIN D DEFICIENCY: ICD-10-CM

## 2020-02-07 DIAGNOSIS — R53.83 FATIGUE, UNSPECIFIED TYPE: Primary | ICD-10-CM

## 2020-02-07 DIAGNOSIS — R79.0 ABNORMAL BLOOD LEVEL OF IRON: ICD-10-CM

## 2020-02-07 PROBLEM — E66.01 MORBID OBESITY DUE TO EXCESS CALORIES (HCC): Status: RESOLVED | Noted: 2019-09-25 | Resolved: 2020-02-07

## 2020-02-07 PROCEDURE — 99024 POSTOP FOLLOW-UP VISIT: CPT | Performed by: PHYSICIAN ASSISTANT

## 2020-02-07 NOTE — PROGRESS NOTES
[facility] Bariatric Surgery  [address]  [facility] [address] [phone]        Patient Name:  Danielle Steward.  :  1970      Date of Visit: 2020      Reason for Visit:   3 months postop     HPI: Danielle Steward is a 49 y.o. female s/p LSG/radha 10/23/19 [facility] (self-pay)    Doing well.  Still slightly discouraged w/ her weight loss, but feels like she is definitely losing inches, noticing clothes fitting differently, overall very pleased.  Notes a little hair loss, but o/w no issues/concerns.  Doing fine w/out PPI.  Has only had heartburn on one occasion w/ chili - wants to continue to try to go w/out meds.  Getting 100g prot/day w/ 1200 abram/day.  Drinking 64 fluid oz/day.  Exercising - walking 2x/week.    Labs 19 - looked good.  Taking recommended vitamins.       Presurgery weight: 242 pounds.  Today's weight is 99.1 kg (218 lb 8 oz) pounds, today's  Body mass index is 33.72 kg/m²., and@ weight loss since surgery is 24 pounds.      ---    Review of Systems: As per HPI, additionally denies dizziness, memory loss, vision changes, paresthesias.     Past Medical History:   Diagnosis Date   • Acalculous cholecystitis    • Back pain    • Blindness and low vision     right eye only, legally blind in right eye, has peripheral vision only   • Chronic kidney disease, stage II (mild)    • Delayed gastric emptying     suspected given large amount of retained food on EGD.   • Dysphagia     occasional   • Gastroparesis    • GERD (gastroesophageal reflux disease)     severe, on Dexilant bid and Zantac daily, controls. EGD by Dr. Agosto in [place] showed GE junction 40 mg, findings of large amount of retained food.     • Glaucoma (increased eye pressure)     per pt not glaucoma yet, but hx of increased pressure in left eye, which is her only functional eye   • Hypertension    • Hypertriglyceridemia    •  (normal spontaneous vaginal delivery)     x 2,  bedrest for 2 weeks with the second for preeclampsia   • Osteoarthritis    • PONV (postoperative nausea and vomiting)    • Prediabetes    • Sleep disturbance     snoring, has not been tested for SUMAYA   • Vitamin D deficiency      Past Surgical History:   Procedure Laterality Date   • CATARACT EXTRACTION Right    • CATARACT EXTRACTION WITH INTRAOCULAR LENS IMPLANT Right    • CHOLECYSTECTOMY N/A 10/23/2019    Procedure: CHOLECYSTECTOMY LAPAROSCOPIC;  Surgeon: Dave Morrissey MD;  Location:  [facility];  Service: General   • COLONOSCOPY     • ENDOSCOPY     • ENDOSCOPY N/A 10/23/2019    Procedure: ESOPHAGOGASTRODUODENOSCOPY;  Surgeon: Dave Morrissey MD;  Location:  [facility];  Service: Bariatric   • GASTRIC SLEEVE LAPAROSCOPIC N/A 10/23/2019    Procedure: GASTRIC SLEEVE LAPAROSCOPIC;  Surgeon: Dave Morrissey MD;  Location:  [facility];  Service: Bariatric   • HYSTEROSCOPY ENDOMETRIAL ABLATION     • LAPAROSCOPIC TUBAL LIGATION     • LASIK     • STRABISMUS SURGERY Right      Outpatient Medications Marked as Taking for the 2/7/20 encounter (Office Visit) with Doris Rosas PA   Medication Sig Dispense Refill   • atenolol (TENORMIN) 50 MG tablet Take 50 mg by mouth Daily.     • Multiple Vitamins-Minerals (PRESERVISION AREDS 2 PO) Take 1 tablet by mouth 2 (Two) Times a Day.         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         /70 (BP Location: Left arm, Patient Position: Sitting, Cuff Size: Large Adult)   Pulse 53   Temp 97 °F (36.1 °C) (Temporal)   Resp 18   Ht 171.5 cm (67.5\")   Wt 99.1 kg (218 lb 8 oz)   SpO2 99%   BMI 33.72 kg/m²     Physical Exam   Constitutional: She appears well-developed and well-nourished. She is cooperative.   HENT:   Mouth/Throat: Oropharynx is clear and moist and mucous membranes are normal.   Eyes: Conjunctivae are normal. No scleral icterus.   Cardiovascular: Normal rate.   Pulmonary/Chest: Effort normal.   Abdominal: Soft. There is no tenderness.   incisions healing well   Musculoskeletal: Normal range of motion. She exhibits no edema.   Neurological: She is alert.   Skin: Skin is warm and dry. No rash noted.   Psychiatric: She has a normal mood and affect. Judgment normal.         Assessment:  3 months s/p LSG/radha 10/23/19 [facility] (self-pay)    ICD-10-CM ICD-9-CM   1. Fatigue, unspecified type R53.83 780.79   2. Elevated LFTs R94.5 790.6   3. Hypokalemia E87.6 276.8   4. Abnormal blood level of iron R79.0 790.6   5. Vitamin D deficiency E55.9 268.9   6. Obesity, Class I, BMI 30-34.9 E66.9 278.00         Plan:  Continue w/ good food choices and healthy habits.  Continue to focus on high protein, low carb.  Increase protein to 100-120g/day.  Keep tracking intake.  Continue routine exercise.  Routine bariatric labs ordered.  Continue vitamins w/ adjustments pending lab results.  Call w/ problems/concerns.     The patient was instructed to follow up in 3 months, sooner if needed.      RYAN Hussein      "

## 2020-02-11 LAB
25(OH)D3+25(OH)D2 SERPL-MCNC: 51.5 NG/ML (ref 30–100)
ALBUMIN SERPL-MCNC: 4.1 G/DL (ref 3.5–5.2)
ALBUMIN/GLOB SERPL: 1.5 G/DL
ALP SERPL-CCNC: 70 U/L (ref 39–117)
ALT SERPL-CCNC: 12 U/L (ref 1–33)
AST SERPL-CCNC: 14 U/L (ref 1–32)
BASOPHILS # BLD AUTO: 0.04 10*3/MM3 (ref 0–0.2)
BASOPHILS NFR BLD AUTO: 0.5 % (ref 0–1.5)
BILIRUB SERPL-MCNC: 0.5 MG/DL (ref 0.2–1.2)
BUN SERPL-MCNC: 20 MG/DL (ref 6–20)
BUN/CREAT SERPL: 26.7 (ref 7–25)
CALCIUM SERPL-MCNC: 9.5 MG/DL (ref 8.6–10.5)
CHLORIDE SERPL-SCNC: 102 MMOL/L (ref 98–107)
CO2 SERPL-SCNC: 29.2 MMOL/L (ref 22–29)
CREAT SERPL-MCNC: 0.75 MG/DL (ref 0.57–1)
EOSINOPHIL # BLD AUTO: 0.28 10*3/MM3 (ref 0–0.4)
EOSINOPHIL NFR BLD AUTO: 3.3 % (ref 0.3–6.2)
ERYTHROCYTE [DISTWIDTH] IN BLOOD BY AUTOMATED COUNT: 12.2 % (ref 12.3–15.4)
FERRITIN SERPL-MCNC: 116 NG/ML (ref 13–150)
FOLATE SERPL-MCNC: >20 NG/ML (ref 4.78–24.2)
GLOBULIN SER CALC-MCNC: 2.7 GM/DL
GLUCOSE SERPL-MCNC: 92 MG/DL (ref 65–99)
HCT VFR BLD AUTO: 44.9 % (ref 34–46.6)
HGB BLD-MCNC: 15 G/DL (ref 12–15.9)
IMM GRANULOCYTES # BLD AUTO: 0.03 10*3/MM3 (ref 0–0.05)
IMM GRANULOCYTES NFR BLD AUTO: 0.3 % (ref 0–0.5)
IRON SERPL-MCNC: 105 MCG/DL (ref 37–145)
LYMPHOCYTES # BLD AUTO: 2.55 10*3/MM3 (ref 0.7–3.1)
LYMPHOCYTES NFR BLD AUTO: 29.7 % (ref 19.6–45.3)
Lab: NORMAL
MCH RBC QN AUTO: 32.3 PG (ref 26.6–33)
MCHC RBC AUTO-ENTMCNC: 33.4 G/DL (ref 31.5–35.7)
MCV RBC AUTO: 96.8 FL (ref 79–97)
METHYLMALONATE SERPL-SCNC: 126 NMOL/L (ref 0–378)
MONOCYTES # BLD AUTO: 0.56 10*3/MM3 (ref 0.1–0.9)
MONOCYTES NFR BLD AUTO: 6.5 % (ref 5–12)
NEUTROPHILS # BLD AUTO: 5.14 10*3/MM3 (ref 1.7–7)
NEUTROPHILS NFR BLD AUTO: 59.7 % (ref 42.7–76)
NRBC BLD AUTO-RTO: 0 /100 WBC (ref 0–0.2)
PLATELET # BLD AUTO: 271 10*3/MM3 (ref 140–450)
POTASSIUM SERPL-SCNC: 4.9 MMOL/L (ref 3.5–5.2)
PREALB SERPL-MCNC: 20 MG/DL (ref 12–34)
PROT SERPL-MCNC: 6.8 G/DL (ref 6–8.5)
RBC # BLD AUTO: 4.64 10*6/MM3 (ref 3.77–5.28)
SODIUM SERPL-SCNC: 141 MMOL/L (ref 136–145)
TSH SERPL DL<=0.005 MIU/L-ACNC: 1.91 UIU/ML (ref 0.27–4.2)
VIT B1 BLD-SCNC: 141.9 NMOL/L (ref 66.5–200)
WBC # BLD AUTO: 8.6 10*3/MM3 (ref 3.4–10.8)

## 2020-06-17 ENCOUNTER — OFFICE VISIT (OUTPATIENT)
Dept: BARIATRICS/WEIGHT MGMT | Facility: CLINIC | Age: 50
End: 2020-06-17

## 2020-06-17 VITALS — BODY MASS INDEX: 33.34 KG/M2 | WEIGHT: 220 LBS | HEIGHT: 68 IN

## 2020-06-17 DIAGNOSIS — E66.9 OBESITY, CLASS I, BMI 30-34.9: Primary | ICD-10-CM

## 2020-06-17 DIAGNOSIS — Z98.84 STATUS POST BARIATRIC SURGERY: ICD-10-CM

## 2020-06-17 PROCEDURE — 99214 OFFICE O/P EST MOD 30 MIN: CPT | Performed by: PHYSICIAN ASSISTANT

## 2020-06-17 NOTE — PROGRESS NOTES
[facility] Bariatric Surgery  [address]  [address]  [facility] [id number] [phone]        Patient Name:  Danielle Steward.  :  1970        Reason for Visit:   8 months postop    HPI: Danielle Steward is a 49 y.o. female  s/p LSG/radha 10/23/19 GDW (self-pay)    This was an audio enabled telemedicine encounter due to covid-19 pandemic, patient consents.      Doing well.  Feels she is not losing weight as fast as she'd like.  Has gone from size 18 to 14/16, 2X to XL. Noticing a change in looks. Current circumstances making diet harder, trying not to eat too much of afternoon snacks.  No other issues/concerns. Denies dysphagia, reflux, nausea, vomiting and abdominal pain.  Getting 100-110g prot/day. Protein shake in morning.  Lunch: small salad with protein, bariatric chili, chicken soup. Dinner: protein and vegetable. Snacks-fruit, pork rinds, protein bar.  Drinking 64+ fluid oz/day, water, coffee, protein shake.  Last labs revealed bariatric levels wnl .  Taking MVI, B12, B1, Calcium, Vit D, iron and Vit C.  On zantac prn.  Exercising- walking, standing desk, knows she needs more activity.     Presurgery weight: 242 pounds.  Today's weight is 99.8 kg (220 lb) pounds, today's  Body mass index is 33.95 kg/m²., and her weight loss since surgery is 22 pounds.      Past Medical History:   Diagnosis Date   • Acalculous cholecystitis    • Back pain    • Blindness and low vision     right eye only, legally blind in right eye, has peripheral vision only   • Chronic kidney disease, stage II (mild)    • Delayed gastric emptying     suspected given large amount of retained food on EGD.   • Dysphagia     occasional   • Gastroparesis    • GERD (gastroesophageal reflux disease)     severe, on Dexilant bid and Zantac daily, controls. EGD by Dr. Agosto in [place] showed GE junction 40 mg, findings of large amount of retained food.     • Glaucoma (increased eye pressure)     per  pt not glaucoma yet, but hx of increased pressure in left eye, which is her only functional eye   • Hypertension    • Hypertriglyceridemia    •  (normal spontaneous vaginal delivery)     x 2, bedrest for 2 weeks with the second for preeclampsia   • Osteoarthritis    • PONV (postoperative nausea and vomiting)    • Prediabetes    • Sleep disturbance     snoring, has not been tested for SUMAYA   • Vitamin D deficiency      Past Surgical History:   Procedure Laterality Date   • CATARACT EXTRACTION Right    • CATARACT EXTRACTION WITH INTRAOCULAR LENS IMPLANT Right    • CHOLECYSTECTOMY N/A 10/23/2019    Procedure: CHOLECYSTECTOMY LAPAROSCOPIC;  Surgeon: Dave Morrissey MD;  Location:  [facility];  Service: General   • COLONOSCOPY     • ENDOSCOPY     • ENDOSCOPY N/A 10/23/2019    Procedure: ESOPHAGOGASTRODUODENOSCOPY;  Surgeon: Dave Morrissey MD;  Location:  [facility];  Service: Bariatric   • GASTRIC SLEEVE LAPAROSCOPIC N/A 10/23/2019    Procedure: GASTRIC SLEEVE LAPAROSCOPIC;  Surgeon: Dave Morrissey MD;  Location:  [facility];  Service: Bariatric   • HYSTEROSCOPY ENDOMETRIAL ABLATION     • LAPAROSCOPIC TUBAL LIGATION     • LASIK     • STRABISMUS SURGERY Right      Outpatient Medications Marked as Taking for the 20 encounter (Office Visit) with Moraima Washington PA-C   Medication Sig Dispense Refill   • atenolol (TENORMIN) 50 MG tablet Take 50 mg by mouth Daily.         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         Ht 171.5 cm (67.5\")   Wt 99.8 kg (220 lb)   BMI 33.95 kg/m²     Physical Exam   Constitutional: She is oriented to person, place, and time.   Neurological: She is alert and oriented to person, place, and time.   Psychiatric: She has a normal mood and affect. Thought content normal.         Assessment:  8 months s/p LSG/radha 10/23/19 GDW (self-pay)      ICD-10-CM ICD-9-CM   1. Obesity, Class I, BMI 30-34.9 E66.9 278.00   2. Status post bariatric surgery Z98.84 V45.86         Plan:  Continue w/ good food choices and healthy habits.  Continue to focus on high protein, low carb.  Keep tracking intake.  Encouraged routine exercise.  Routine bariatric labs deferred.  Continue vitamins w/ adjustments.   Call w/ problems/concerns.     Patient's Body mass index is 33.95 kg/m². BMI is above normal parameters. Recommendations include: exercise counseling and nutrition counseling.      The patient was instructed to follow up in 3 months, sooner if needed.      Total time spent w/ patient 25 minutes and 15 minutes spent counseling the patient on nutrition and necessary dietary/lifestyle modifications.        "

## 2020-10-08 ENCOUNTER — OFFICE VISIT (OUTPATIENT)
Dept: BARIATRICS/WEIGHT MGMT | Facility: CLINIC | Age: 50
End: 2020-10-08

## 2020-10-08 VITALS
SYSTOLIC BLOOD PRESSURE: 116 MMHG | TEMPERATURE: 97.8 F | HEART RATE: 68 BPM | WEIGHT: 232 LBS | BODY MASS INDEX: 35.16 KG/M2 | RESPIRATION RATE: 18 BRPM | DIASTOLIC BLOOD PRESSURE: 64 MMHG | HEIGHT: 68 IN | OXYGEN SATURATION: 99 %

## 2020-10-08 DIAGNOSIS — Z13.21 MALNUTRITION SCREEN: ICD-10-CM

## 2020-10-08 DIAGNOSIS — Z13.0 SCREENING, IRON DEFICIENCY ANEMIA: ICD-10-CM

## 2020-10-08 DIAGNOSIS — K91.2 POSTGASTRECTOMY MALABSORPTION: ICD-10-CM

## 2020-10-08 DIAGNOSIS — Z90.3 POSTGASTRECTOMY MALABSORPTION: ICD-10-CM

## 2020-10-08 DIAGNOSIS — K21.9 GASTROESOPHAGEAL REFLUX DISEASE, UNSPECIFIED WHETHER ESOPHAGITIS PRESENT: ICD-10-CM

## 2020-10-08 DIAGNOSIS — R53.83 FATIGUE, UNSPECIFIED TYPE: Primary | ICD-10-CM

## 2020-10-08 DIAGNOSIS — E55.9 HYPOVITAMINOSIS D: ICD-10-CM

## 2020-10-08 PROCEDURE — 99214 OFFICE O/P EST MOD 30 MIN: CPT | Performed by: SURGERY

## 2020-10-08 NOTE — PROGRESS NOTES
[facility] Bariatric Surgery  [address]  [address]  [facility] [address] [phone]        Patient Name:  Danielle Steward.  :  1970      Date of Visit: 10/8/2020      Reason for Visit:   1 years postop      HPI: Danielle Steward is a 49 y.o. female s/p LSG/radha 10/23/19 GDW (self-pay)      Doing well.  No issues/concerns. Denies dysphagia, reflux, nausea, vomiting and abdominal pain.  Getting 100+g prot/day.  Drinking 64 fluid oz/day. Last labs revealed  no vitamin deficiencies. Taking MVI, Calcium, Vit D and iron.  Exercise: walking in neighborhood the past 3 weeks, 50-60 min 3x per week.  She has not lost weight since starting exercise, but thinks her clothes are looser.     Presurgery weight: 242 pounds.  Today's weight is 105 kg (232 lb) pounds, today's  Body mass index is 35.8 kg/m²., and@ weight loss since surgery is 10 pounds.    Highest weight before surgery 275. She is pleased that her most recent labs were much improved.      Eats well at work, struggles at home after work.    Breakfast: [employer]  Lunch: [employer] sandwich without bun, no fries or calorie drinks.  Snack: protein bar  Supper: meat + veg  After dinner: ice cream sometimes, snack on almonds, pork rinds, fruit. Has the munchies all evening.  Denies calorie drinks.      Past Medical History:   Diagnosis Date   • Acalculous cholecystitis    • Back pain    • Blindness and low vision     right eye only, legally blind in right eye, has peripheral vision only   • Chronic kidney disease, stage II (mild)    • Delayed gastric emptying     suspected given large amount of retained food on EGD.   • Dysphagia     occasional   • Gastroparesis    • GERD (gastroesophageal reflux disease)     severe, on Dexilant bid and Zantac daily, controls. EGD by Dr. Agosto in [place] showed GE junction 40 mg, findings of large amount of retained food.     • Glaucoma (increased eye pressure)     per pt not glaucoma yet,  but hx of increased pressure in left eye, which is her only functional eye   • Hypertension    • Hypertriglyceridemia    •  (normal spontaneous vaginal delivery)     x 2, bedrest for 2 weeks with the second for preeclampsia   • Osteoarthritis    • PONV (postoperative nausea and vomiting)    • Prediabetes    • Sleep disturbance     snoring, has not been tested for SUMAYA   • Vitamin D deficiency      Past Surgical History:   Procedure Laterality Date   • CATARACT EXTRACTION Right    • CATARACT EXTRACTION WITH INTRAOCULAR LENS IMPLANT Right    • CHOLECYSTECTOMY N/A 10/23/2019    Procedure: CHOLECYSTECTOMY LAPAROSCOPIC;  Surgeon: Dave Morrissey MD;  Location:  [facility];  Service: General   • COLONOSCOPY     • ENDOSCOPY     • ENDOSCOPY N/A 10/23/2019    Procedure: ESOPHAGOGASTRODUODENOSCOPY;  Surgeon: Dave Morrissey MD;  Location:  [facility];  Service: Bariatric   • GASTRIC SLEEVE LAPAROSCOPIC N/A 10/23/2019    Procedure: GASTRIC SLEEVE LAPAROSCOPIC;  Surgeon: Dave Morrissey MD;  Location:  [facility];  Service: Bariatric   • HYSTEROSCOPY ENDOMETRIAL ABLATION     • LAPAROSCOPIC TUBAL LIGATION     • LASIK     • STRABISMUS SURGERY Right      Outpatient Medications Marked as Taking for the 10/8/20 encounter (Office Visit) with Sherin Danielson MD   Medication Sig Dispense Refill   • atenolol (TENORMIN) 50 MG tablet Take 50 mg by mouth Daily.     • Multiple Vitamins-Minerals (PRESERVISION AREDS 2 PO) Take 1 tablet by mouth 2 (Two) Times a Day.         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         /64 (BP Location: Left arm, Patient Position: Sitting, Cuff Size: Large Adult)   Pulse 68   Temp 97.8 °F (36.6 °C) (Temporal)   Resp 18   Ht 171.5 cm (67.5\")   Wt 105 kg (232 lb)   SpO2 99%   BMI 35.80 kg/m²     Physical Exam  Constitutional:       General: She is not in acute distress.     Appearance: She is well-developed. She is not diaphoretic.   HENT:      Head: Normocephalic and atraumatic.      Mouth/Throat:      Pharynx: No oropharyngeal exudate.   Eyes:      Conjunctiva/sclera: Conjunctivae normal.      Pupils: Pupils are equal, round, and reactive to light.   Pulmonary:      Effort: Pulmonary effort is normal. No respiratory distress.   Abdominal:      General: There is no distension.      Palpations: Abdomen is soft.   Skin:     General: Skin is warm and dry.      Coloration: Skin is not pale.   Neurological:      Mental Status: She is alert and oriented to person, place, and time.      Cranial Nerves: No cranial nerve deficit.   Psychiatric:         Behavior: Behavior normal.         Thought Content: Thought content normal.           Assessment:  1 years s/p LSG/radha 10/23/19 GDW (self-pay)      ICD-10-CM ICD-9-CM   1. Fatigue, unspecified type  R53.83 780.79   2. Hypovitaminosis D  E55.9 268.9   3. Malnutrition screen  Z13.21 V77.2   4. Screening, iron deficiency anemia  Z13.0 V78.0   5. Postgastrectomy malabsorption  K91.2 579.3    Z90.3    6. Gastroesophageal reflux disease, unspecified whether esophagitis present  K21.9 530.81         Plan:  Doing well. " Continue w/ good food choices and healthy habits.  Continue protein >70g/day.  Continue routine exercise.  Routine bariatric labs ordered.  Continue vitamins w/ adjustments pending lab results.  Call w/ problems/concerns.     BMR test not available, but recommending [id number] abram/day, 100 g/day protein, 100 g or less of total carbs.  Track daily.    The patient was instructed to follow up in 2 months, sooner if needed.    note: approx 15 of the 25 minute visit was spent counseling on nutrition and necessary dietary/lifestyle modifications face to face.    Sherin Danielson MD

## 2020-10-14 LAB
25(OH)D3+25(OH)D2 SERPL-MCNC: 57.6 NG/ML (ref 30–100)
ALBUMIN SERPL-MCNC: 4.6 G/DL (ref 3.5–5.2)
ALBUMIN/GLOB SERPL: 1.8 G/DL
ALP SERPL-CCNC: 71 U/L (ref 39–117)
ALT SERPL-CCNC: 19 U/L (ref 1–33)
AST SERPL-CCNC: 19 U/L (ref 1–32)
BASOPHILS # BLD AUTO: 0.05 10*3/MM3 (ref 0–0.2)
BASOPHILS NFR BLD AUTO: 0.6 % (ref 0–1.5)
BILIRUB SERPL-MCNC: 0.5 MG/DL (ref 0–1.2)
BUN SERPL-MCNC: 26 MG/DL (ref 6–20)
BUN/CREAT SERPL: 28 (ref 7–25)
CALCIUM SERPL-MCNC: 9.5 MG/DL (ref 8.6–10.5)
CHLORIDE SERPL-SCNC: 101 MMOL/L (ref 98–107)
CO2 SERPL-SCNC: 32.5 MMOL/L (ref 22–29)
CREAT SERPL-MCNC: 0.93 MG/DL (ref 0.57–1)
EOSINOPHIL # BLD AUTO: 0.21 10*3/MM3 (ref 0–0.4)
EOSINOPHIL NFR BLD AUTO: 2.7 % (ref 0.3–6.2)
ERYTHROCYTE [DISTWIDTH] IN BLOOD BY AUTOMATED COUNT: 11.5 % (ref 12.3–15.4)
FERRITIN SERPL-MCNC: 234 NG/ML (ref 13–150)
FOLATE SERPL-MCNC: >20 NG/ML (ref 4.78–24.2)
GLOBULIN SER CALC-MCNC: 2.5 GM/DL
GLUCOSE SERPL-MCNC: 88 MG/DL (ref 65–99)
HCT VFR BLD AUTO: 46.2 % (ref 34–46.6)
HGB BLD-MCNC: 15.5 G/DL (ref 12–15.9)
IMM GRANULOCYTES # BLD AUTO: 0.02 10*3/MM3 (ref 0–0.05)
IMM GRANULOCYTES NFR BLD AUTO: 0.3 % (ref 0–0.5)
IRON SERPL-MCNC: 128 MCG/DL (ref 37–145)
LYMPHOCYTES # BLD AUTO: 2.28 10*3/MM3 (ref 0.7–3.1)
LYMPHOCYTES NFR BLD AUTO: 28.9 % (ref 19.6–45.3)
Lab: NORMAL
MCH RBC QN AUTO: 32.8 PG (ref 26.6–33)
MCHC RBC AUTO-ENTMCNC: 33.5 G/DL (ref 31.5–35.7)
MCV RBC AUTO: 97.7 FL (ref 79–97)
METHYLMALONATE SERPL-SCNC: 135 NMOL/L (ref 0–378)
MONOCYTES # BLD AUTO: 0.49 10*3/MM3 (ref 0.1–0.9)
MONOCYTES NFR BLD AUTO: 6.2 % (ref 5–12)
NEUTROPHILS # BLD AUTO: 4.84 10*3/MM3 (ref 1.7–7)
NEUTROPHILS NFR BLD AUTO: 61.3 % (ref 42.7–76)
NRBC BLD AUTO-RTO: 0 /100 WBC (ref 0–0.2)
PLATELET # BLD AUTO: 276 10*3/MM3 (ref 140–450)
POTASSIUM SERPL-SCNC: 4.9 MMOL/L (ref 3.5–5.2)
PREALB SERPL-MCNC: 25 MG/DL (ref 12–34)
PROT SERPL-MCNC: 7.1 G/DL (ref 6–8.5)
RBC # BLD AUTO: 4.73 10*6/MM3 (ref 3.77–5.28)
SODIUM SERPL-SCNC: 140 MMOL/L (ref 136–145)
VIT B1 BLD-SCNC: 172.9 NMOL/L (ref 66.5–200)
WBC # BLD AUTO: 7.89 10*3/MM3 (ref 3.4–10.8)

## 2020-10-29 ENCOUNTER — HOSPITAL ENCOUNTER (EMERGENCY)
Facility: HOSPITAL | Age: 50
Discharge: HOME | End: 2020-10-29
Attending: FAMILY MEDICINE | Admitting: FAMILY MEDICINE

## 2020-10-29 VITALS
DIASTOLIC BLOOD PRESSURE: 84 MMHG | SYSTOLIC BLOOD PRESSURE: 134 MMHG | HEIGHT: 69 IN | BODY MASS INDEX: 32.58 KG/M2 | OXYGEN SATURATION: 100 % | WEIGHT: 220 LBS | HEART RATE: 74 BPM | RESPIRATION RATE: 17 BRPM | TEMPERATURE: 98.6 F

## 2020-10-29 DIAGNOSIS — S61.217A LACERATION OF LEFT LITTLE FINGER WITHOUT FOREIGN BODY WITHOUT DAMAGE TO NAIL, INITIAL ENCOUNTER: Primary | ICD-10-CM

## 2020-10-29 PROCEDURE — 25010000002 TDAP 5-2.5-18.5 LF-MCG/0.5 SUSPENSION: Performed by: PHYSICIAN ASSISTANT

## 2020-10-29 PROCEDURE — 90715 TDAP VACCINE 7 YRS/> IM: CPT | Performed by: PHYSICIAN ASSISTANT

## 2020-10-29 PROCEDURE — 90471 IMMUNIZATION ADMIN: CPT | Performed by: PHYSICIAN ASSISTANT

## 2020-10-29 PROCEDURE — 99283 EMERGENCY DEPT VISIT LOW MDM: CPT

## 2020-10-29 RX ORDER — CEPHALEXIN 500 MG/1
500 CAPSULE ORAL 4 TIMES DAILY
Qty: 28 CAPSULE | Refills: 0 | Status: SHIPPED | OUTPATIENT
Start: 2020-10-29 | End: 2020-11-05

## 2020-10-29 RX ORDER — CEPHALEXIN 250 MG/1
500 CAPSULE ORAL ONCE
Status: COMPLETED | OUTPATIENT
Start: 2020-10-29 | End: 2020-10-29

## 2020-10-29 RX ADMIN — TETANUS TOXOID, REDUCED DIPHTHERIA TOXOID AND ACELLULAR PERTUSSIS VACCINE, ADSORBED 0.5 ML: 5; 2.5; 8; 8; 2.5 SUSPENSION INTRAMUSCULAR at 21:19

## 2020-10-29 RX ADMIN — CEPHALEXIN 500 MG: 250 CAPSULE ORAL at 21:19

## 2021-01-21 ENCOUNTER — IMMUNIZATION (OUTPATIENT)
Dept: VACCINE CLINIC | Facility: HOSPITAL | Age: 51
End: 2021-01-21

## 2021-01-21 PROCEDURE — 0001A: CPT | Performed by: FAMILY MEDICINE

## 2021-01-21 PROCEDURE — 91300 HC SARSCOV02 VAC 30MCG/0.3ML IM: CPT | Performed by: FAMILY MEDICINE

## 2021-01-27 ENCOUNTER — DOCUMENTATION (OUTPATIENT)
Dept: BARIATRICS/WEIGHT MGMT | Facility: CLINIC | Age: 51
End: 2021-01-27

## 2021-01-27 ENCOUNTER — OFFICE VISIT (OUTPATIENT)
Dept: BARIATRICS/WEIGHT MGMT | Facility: CLINIC | Age: 51
End: 2021-01-27

## 2021-01-27 VITALS
HEIGHT: 68 IN | OXYGEN SATURATION: 99 % | SYSTOLIC BLOOD PRESSURE: 122 MMHG | WEIGHT: 236.5 LBS | TEMPERATURE: 97.8 F | RESPIRATION RATE: 18 BRPM | BODY MASS INDEX: 35.84 KG/M2 | HEART RATE: 70 BPM | DIASTOLIC BLOOD PRESSURE: 68 MMHG

## 2021-01-27 DIAGNOSIS — E66.9 OBESITY, CLASS II, BMI 35-39.9: Primary | ICD-10-CM

## 2021-01-27 PROCEDURE — 99214 OFFICE O/P EST MOD 30 MIN: CPT | Performed by: PHYSICIAN ASSISTANT

## 2021-01-27 NOTE — PROGRESS NOTES
"[facility] Bariatric Surgery  [address]  [address]  [facility] [address] [phone]        Patient Name:  Danielle Steward.  :  1970      Date of Visit: 2021      Reason for Visit:   Routine Eval - weight check    HPI: Danielle Steward is a 50 y.o. female s/p LSG/radha 10/23/19 GDW (self-pay)    Admits she is frustrated - gaining weight.  Says \"I know what I need to do, to do better, just not doing it.\"  Really wants to get back on track.  Admits she is snacking too much, typically in the evenings.  Really craving sugars, especially since the holidays.  Not tracking intake.  Not meal prepping/planning.  Struggles to find the time to devote to healthier habits.  Needing to exercise more, but again cannot find the time.  Not able to attend the online support group sessions d/t scheduling.      Not interested in pursuing any additional weight loss surgeries.  Not interested in psychotherapy.  May consider referral to Medical Weight Loss, but not ready for that at this time.        Presurgery weight: 242 pounds.  Today's weight is 107 kg (236 lb 8 oz) pounds, today's  Body mass index is 36.49 kg/m²., and weight loss since surgery is 6 pounds.        Past Medical History:   Diagnosis Date   • Acalculous cholecystitis    • Back pain    • Blindness and low vision     right eye only, legally blind in right eye, has peripheral vision only   • Chronic kidney disease, stage II (mild)    • Delayed gastric emptying     suspected given large amount of retained food on EGD.   • Dysphagia     occasional   • Gastroparesis    • GERD (gastroesophageal reflux disease)     severe, on Dexilant bid and Zantac daily, controls. EGD by Dr. Agosto in [place] showed GE junction 40 mg, findings of large amount of retained food.     • Glaucoma (increased eye pressure)     per pt not glaucoma yet, but hx of increased pressure in left eye, which is her only functional eye   • Hypertension "    • Hypertriglyceridemia    •  (normal spontaneous vaginal delivery)     x 2, bedrest for 2 weeks with the second for preeclampsia   • Osteoarthritis    • PONV (postoperative nausea and vomiting)    • Prediabetes    • Sleep disturbance     snoring, has not been tested for SUMAYA   • Vitamin D deficiency      Past Surgical History:   Procedure Laterality Date   • CATARACT EXTRACTION Right    • CATARACT EXTRACTION WITH INTRAOCULAR LENS IMPLANT Right    • CHOLECYSTECTOMY N/A 10/23/2019    Procedure: CHOLECYSTECTOMY LAPAROSCOPIC;  Surgeon: Dave Morrissey MD;  Location:  [facility];  Service: General   • COLONOSCOPY     • ENDOSCOPY     • ENDOSCOPY N/A 10/23/2019    Procedure: ESOPHAGOGASTRODUODENOSCOPY;  Surgeon: Dave Morrissey MD;  Location:  [facility];  Service: Bariatric   • GASTRIC SLEEVE LAPAROSCOPIC N/A 10/23/2019    Procedure: GASTRIC SLEEVE LAPAROSCOPIC;  Surgeon: Dave Morrissey MD;  Location:  [facility];  Service: Bariatric   • HYSTEROSCOPY ENDOMETRIAL ABLATION     • LAPAROSCOPIC TUBAL LIGATION     • LASIK     • STRABISMUS SURGERY Right      Outpatient Medications Marked as Taking for the 21 encounter (Office Visit) with Doris Rosas PA   Medication Sig Dispense Refill   • atenolol (TENORMIN) 50 MG tablet Take 50 mg by mouth Daily.     • Multiple Vitamins-Minerals (PRESERVISION AREDS 2 PO) Take 1 tablet by mouth 2 (Two) Times a Day.         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         /68 (BP Location: Left arm, Patient Position: Sitting, Cuff Size: Large Adult)   Pulse 70   Temp 97.8 °F (36.6 °C) (Temporal)   Resp 18   Ht 171.5 cm (67.5\")   Wt 107 kg (236 lb 8 oz)   SpO2 99%   BMI 36.49 kg/m²     Physical Exam   Constitutional: She appears well-developed. She is cooperative.   wearing a mask   Eyes: Conjunctivae are normal. No scleral icterus.   Cardiovascular: Normal rate.   Pulmonary/Chest: Effort normal.   Musculoskeletal: Normal range of motion.   Neurological: She is alert.   Psychiatric: Judgment normal.         Assessment:  1 year s/p LSG/radha 10/23/19 GDW (self-pay)    ICD-10-CM ICD-9-CM   1. Obesity, Class II, BMI 35-39.9  E66.9 278.00       Patient's Body mass index is 36.49 kg/m². BMI is above normal parameters. Recommendations include: exercise counseling and nutrition counseling.      Plan:  Discussed necessary dietary/lifestyle modifications.  Encouraged to refocus on healthy habits and prioritize time for herself.  Will refer to dietitian for further eval/assistance.     Not interested in pursuing any additional weight loss surgeries.  Not interested in psychotherapy.  May consider referral to Medical Weight Loss, but not ready for that at this time.     The patient was instructed to follow up in 6 weeks, sooner if needed.      RYAN Hussein      "

## 2021-01-27 NOTE — PROGRESS NOTES
"Bariatric Surgery  Post-surgical Nutrition Follow-up    Danielle العلي Bin  2021  [id number]  [id number]  1970  female       Ht 171.5 cm (67.5\")   Wt 107 kg (236 lb 8 oz)  Past Medical History:   Diagnosis Date   • Acalculous cholecystitis    • Back pain    • Blindness and low vision     right eye only, legally blind in right eye, has peripheral vision only   • Chronic kidney disease, stage II (mild)    • Delayed gastric emptying     suspected given large amount of retained food on EGD.   • Dysphagia     occasional   • Gastroparesis    • GERD (gastroesophageal reflux disease)     severe, on Dexilant bid and Zantac daily, controls. EGD by Dr. Agosto in [place] showed GE junction 40 mg, findings of large amount of retained food.     • Glaucoma (increased eye pressure)     per pt not glaucoma yet, but hx of increased pressure in left eye, which is her only functional eye   • Hypertension    • Hypertriglyceridemia    •  (normal spontaneous vaginal delivery)     x 2, bedrest for 2 weeks with the second for preeclampsia   • Osteoarthritis    • PONV (postoperative nausea and vomiting)    • Prediabetes    • Sleep disturbance     snoring, has not been tested for SUMAYA   • Vitamin D deficiency      Past Surgical History:   Procedure Laterality Date   • CATARACT EXTRACTION Right    • CATARACT EXTRACTION WITH INTRAOCULAR LENS IMPLANT Right    • CHOLECYSTECTOMY N/A 10/23/2019    Procedure: CHOLECYSTECTOMY LAPAROSCOPIC;  Surgeon: Dave Morrissey MD;  Location:  [facility];  Service: General   • COLONOSCOPY     • ENDOSCOPY     • ENDOSCOPY N/A 10/23/2019    Procedure: ESOPHAGOGASTRODUODENOSCOPY;  Surgeon: Dave Morrissey MD;  Location:  [facility];  Service: Bariatric   • GASTRIC SLEEVE LAPAROSCOPIC N/A 10/23/2019    Procedure: GASTRIC SLEEVE LAPAROSCOPIC;  Surgeon: Dave Morrissey MD;  Location:  [facility];  Service: Bariatric   • HYSTEROSCOPY ENDOMETRIAL ABLATION     • LAPAROSCOPIC TUBAL LIGATION   "   • LASIK     • STRABISMUS SURGERY Right      Allergies   Allergen Reactions   • Methylprednisolone Other (See Comments)     Avoids due to increased pressure in eye/glaucoma risk per her optometrist       Current Outpatient Medications:   •  atenolol (TENORMIN) 50 MG tablet, Take 50 mg by mouth Daily., Disp: , Rfl:   •  Multiple Vitamins-Minerals (PRESERVISION AREDS 2 PO), Take 1 tablet by mouth 2 (Two) Times a Day., Disp: , Rfl:       Nutrition Assessment    Estimated energy needs:  2000    Estimated calories for weight loss:  1400    IBW (Pounds):  170        Excess body weight (Pounds): 66       Nutrition Recall  24 Hour recall: (B) (L) (D) -  Reviewed and discussed with patient  Protein shake, coffee  Salad with steak  Atkinson cake, sweet potatoes, water      Exercise  Not currently exercising but open to exercise.        Education    Provided 1400 calorie meal plan.      Nutrition Goals   Dietary Guidelines per manual  Protein goal:  grams per day     Exercise Goals  Add 15-30 minutes of activity per day as tolerated        Angie Candelario RD  01/27/2021  11:10 EST

## 2021-02-11 ENCOUNTER — IMMUNIZATION (OUTPATIENT)
Dept: VACCINE CLINIC | Facility: HOSPITAL | Age: 51
End: 2021-02-11

## 2021-02-11 PROCEDURE — 91300 HC SARSCOV02 VAC 30MCG/0.3ML IM: CPT | Performed by: INTERNAL MEDICINE

## 2021-02-11 PROCEDURE — 0002A: CPT | Performed by: INTERNAL MEDICINE

## (undated) DEVICE — TROCAR: Brand: KII FIOS FIRST ENTRY

## (undated) DEVICE — GOWN,NON-REINFORCED,SIRUS,SET IN SLV,XXL: Brand: MEDLINE

## (undated) DEVICE — COVER,LIGHT HANDLE,FLX,1/PK: Brand: MEDLINE INDUSTRIES, INC.

## (undated) DEVICE — CLMP STD 22CM DISP

## (undated) DEVICE — ENDOPATH XCEL UNIVERSAL TROCAR STABLILITY SLEEVES: Brand: ENDOPATH XCEL

## (undated) DEVICE — ENDOPATH 5MM ENDOSCOPIC BLUNT TIP DISSECTORS (12 POUCHES CONTAINING 3 DISSECTORS EACH): Brand: ENDOPATH

## (undated) DEVICE — ENDOPATH XCEL BLADELESS TROCARS WITH STABILITY SLEEVES: Brand: ENDOPATH XCEL

## (undated) DEVICE — [HIGH FLOW INSUFFLATOR,  DO NOT USE IF PACKAGE IS DAMAGED,  KEEP DRY,  KEEP AWAY FROM SUNLIGHT,  PROTECT FROM HEAT AND RADIOACTIVE SOURCES.]: Brand: PNEUMOSURE

## (undated) DEVICE — 3 RING SUTURE PASSER - 16 CM: Brand: 3 RING SUTURE PASSER - 16 CM

## (undated) DEVICE — APPL CHLORAPREP W/TINT 26ML BLU

## (undated) DEVICE — APPL COTN TP PLSTC 6IN STRL LF PK/2

## (undated) DEVICE — PK BARIATRIC 10

## (undated) DEVICE — MARYLAND JAW LAPAROSCOPIC SEALER/DIVIDER COATED: Brand: LIGASURE

## (undated) DEVICE — INTENDED USE FOR SURGICAL MARKING ON INTACT SKIN, ALSO PROVIDES A PERMANENT METHOD OF IDENTIFYING OBJECTS IN THE OPERATING ROOM: Brand: WRITESITE® REGULAR TIP SKIN MARKER

## (undated) DEVICE — FLTR PLUMEPORT LAP W/CONN STRL

## (undated) DEVICE — TISSUE RETRIEVAL SYSTEM: Brand: INZII RETRIEVAL SYSTEM

## (undated) DEVICE — SKIN AFFIX SURG ADHESIVE 72/CS 0.55ML: Brand: MEDLINE

## (undated) DEVICE — SUT MONOCRYL PLS ANTIB UND 3/0  PS1 27IN

## (undated) DEVICE — GLV SURG SENSICARE MICRO PF LF 9 STRL

## (undated) DEVICE — SHT AIR TRANSFR COMFRT GLIDE LT LAT 40X80IN

## (undated) DEVICE — UNDRPD COMFRT GLD DRYPAD 36X57IN

## (undated) DEVICE — POWER SHELL: Brand: SIGNIA

## (undated) DEVICE — GLV SURG SENSICARE MICRO PF LF 8.5 STRL